# Patient Record
Sex: MALE | Race: WHITE | NOT HISPANIC OR LATINO | Employment: OTHER | URBAN - METROPOLITAN AREA
[De-identification: names, ages, dates, MRNs, and addresses within clinical notes are randomized per-mention and may not be internally consistent; named-entity substitution may affect disease eponyms.]

---

## 2022-04-12 ENCOUNTER — APPOINTMENT (OUTPATIENT)
Dept: CT IMAGING | Facility: HOSPITAL | Age: 72
End: 2022-04-12

## 2022-04-12 ENCOUNTER — HOSPITAL ENCOUNTER (INPATIENT)
Facility: HOSPITAL | Age: 72
LOS: 5 days | Discharge: NURSING, REHAB OR LONG-TERM CARE | End: 2022-04-17
Attending: EMERGENCY MEDICINE | Admitting: HOSPITALIST

## 2022-04-12 ENCOUNTER — APPOINTMENT (OUTPATIENT)
Dept: GENERAL RADIOLOGY | Facility: HOSPITAL | Age: 72
End: 2022-04-12

## 2022-04-12 DIAGNOSIS — R41.82 ALTERED MENTAL STATUS, UNSPECIFIED ALTERED MENTAL STATUS TYPE: Primary | ICD-10-CM

## 2022-04-12 DIAGNOSIS — R63.30 FEEDING DIFFICULTY: ICD-10-CM

## 2022-04-12 PROBLEM — R09.89 SUSPECTED CEREBROVASCULAR ACCIDENT (CVA): Status: ACTIVE | Noted: 2022-04-12

## 2022-04-12 PROBLEM — F03.918 DEMENTIA WITH BEHAVIORAL DISTURBANCE: Chronic | Status: ACTIVE | Noted: 2022-04-12

## 2022-04-12 PROBLEM — I10 ESSENTIAL HYPERTENSION: Chronic | Status: ACTIVE | Noted: 2022-04-12

## 2022-04-12 PROBLEM — R29.810 FACIAL DROOP: Status: ACTIVE | Noted: 2022-04-12

## 2022-04-12 PROBLEM — R47.01 APHASIA: Status: ACTIVE | Noted: 2022-04-12

## 2022-04-12 LAB
ABO GROUP BLD: NORMAL
ALBUMIN SERPL-MCNC: 4.2 G/DL (ref 3.5–5.2)
ALBUMIN/GLOB SERPL: 1.3 G/DL
ALP SERPL-CCNC: 116 U/L (ref 39–117)
ALT SERPL W P-5'-P-CCNC: 11 U/L (ref 1–41)
ANION GAP SERPL CALCULATED.3IONS-SCNC: 12 MMOL/L (ref 5–15)
AST SERPL-CCNC: 20 U/L (ref 1–40)
BASOPHILS # BLD AUTO: 0.1 10*3/MM3 (ref 0–0.2)
BASOPHILS NFR BLD AUTO: 1 % (ref 0–1.5)
BILIRUB SERPL-MCNC: 1 MG/DL (ref 0–1.2)
BILIRUB UR QL STRIP: NEGATIVE
BLD GP AB SCN SERPL QL: NEGATIVE
BUN SERPL-MCNC: 12 MG/DL (ref 8–23)
BUN/CREAT SERPL: 12.5 (ref 7–25)
CALCIUM SPEC-SCNC: 9.2 MG/DL (ref 8.6–10.5)
CHLORIDE SERPL-SCNC: 102 MMOL/L (ref 98–107)
CHOLEST SERPL-MCNC: 123 MG/DL (ref 0–200)
CLARITY UR: CLEAR
CO2 SERPL-SCNC: 25 MMOL/L (ref 22–29)
COLOR UR: ABNORMAL
CREAT SERPL-MCNC: 0.96 MG/DL (ref 0.76–1.27)
DEPRECATED RDW RBC AUTO: 38.5 FL (ref 37–54)
EGFRCR SERPLBLD CKD-EPI 2021: 84 ML/MIN/1.73
EOSINOPHIL # BLD AUTO: 0.1 10*3/MM3 (ref 0–0.4)
EOSINOPHIL NFR BLD AUTO: 1 % (ref 0.3–6.2)
ERYTHROCYTE [DISTWIDTH] IN BLOOD BY AUTOMATED COUNT: 12.8 % (ref 12.3–15.4)
ETHANOL UR QL: <0.01 %
GLOBULIN UR ELPH-MCNC: 3.3 GM/DL
GLUCOSE BLDC GLUCOMTR-MCNC: 111 MG/DL (ref 70–105)
GLUCOSE BLDC GLUCOMTR-MCNC: 94 MG/DL (ref 70–105)
GLUCOSE SERPL-MCNC: 112 MG/DL (ref 65–99)
GLUCOSE UR STRIP-MCNC: NEGATIVE MG/DL
HCT VFR BLD AUTO: 38.5 % (ref 37.5–51)
HDLC SERPL-MCNC: 36 MG/DL (ref 40–60)
HGB BLD-MCNC: 13.7 G/DL (ref 13–17.7)
HGB UR QL STRIP.AUTO: NEGATIVE
HOLD SPECIMEN: NORMAL
HOLD SPECIMEN: NORMAL
INR PPP: 1.01 (ref 0.93–1.1)
KETONES UR QL STRIP: NEGATIVE
LDLC SERPL CALC-MCNC: 68 MG/DL (ref 0–100)
LDLC/HDLC SERPL: 1.87 {RATIO}
LEUKOCYTE ESTERASE UR QL STRIP.AUTO: NEGATIVE
LYMPHOCYTES # BLD AUTO: 0.6 10*3/MM3 (ref 0.7–3.1)
LYMPHOCYTES NFR BLD AUTO: 11.1 % (ref 19.6–45.3)
MCH RBC QN AUTO: 30.9 PG (ref 26.6–33)
MCHC RBC AUTO-ENTMCNC: 35.5 G/DL (ref 31.5–35.7)
MCV RBC AUTO: 86.8 FL (ref 79–97)
MONOCYTES # BLD AUTO: 0.4 10*3/MM3 (ref 0.1–0.9)
MONOCYTES NFR BLD AUTO: 7 % (ref 5–12)
NEUTROPHILS NFR BLD AUTO: 4.4 10*3/MM3 (ref 1.7–7)
NEUTROPHILS NFR BLD AUTO: 79.9 % (ref 42.7–76)
NITRITE UR QL STRIP: NEGATIVE
NRBC BLD AUTO-RTO: 0.1 /100 WBC (ref 0–0.2)
PH UR STRIP.AUTO: 5.5 [PH] (ref 5–8)
PLATELET # BLD AUTO: 253 10*3/MM3 (ref 140–450)
PMV BLD AUTO: 8.6 FL (ref 6–12)
POTASSIUM SERPL-SCNC: 3.9 MMOL/L (ref 3.5–5.2)
PROT SERPL-MCNC: 7.5 G/DL (ref 6–8.5)
PROT UR QL STRIP: NEGATIVE
PROTHROMBIN TIME: 10.4 SECONDS (ref 9.6–11.7)
RBC # BLD AUTO: 4.43 10*6/MM3 (ref 4.14–5.8)
RH BLD: POSITIVE
SARS-COV-2 RNA PNL SPEC NAA+PROBE: NOT DETECTED
SODIUM SERPL-SCNC: 139 MMOL/L (ref 136–145)
SP GR UR STRIP: 1.01 (ref 1–1.03)
T&S EXPIRATION DATE: NORMAL
TRIGL SERPL-MCNC: 99 MG/DL (ref 0–150)
TSH SERPL DL<=0.05 MIU/L-ACNC: 1.88 UIU/ML (ref 0.27–4.2)
UROBILINOGEN UR QL STRIP: ABNORMAL
VLDLC SERPL-MCNC: 19 MG/DL (ref 5–40)
WBC NRBC COR # BLD: 5.5 10*3/MM3 (ref 3.4–10.8)
WHOLE BLOOD HOLD SPECIMEN: NORMAL

## 2022-04-12 PROCEDURE — 93005 ELECTROCARDIOGRAM TRACING: CPT | Performed by: EMERGENCY MEDICINE

## 2022-04-12 PROCEDURE — 80053 COMPREHEN METABOLIC PANEL: CPT | Performed by: EMERGENCY MEDICINE

## 2022-04-12 PROCEDURE — 99285 EMERGENCY DEPT VISIT HI MDM: CPT

## 2022-04-12 PROCEDURE — 86900 BLOOD TYPING SEROLOGIC ABO: CPT

## 2022-04-12 PROCEDURE — 82607 VITAMIN B-12: CPT | Performed by: NURSE PRACTITIONER

## 2022-04-12 PROCEDURE — 85025 COMPLETE CBC W/AUTO DIFF WBC: CPT | Performed by: EMERGENCY MEDICINE

## 2022-04-12 PROCEDURE — 99222 1ST HOSP IP/OBS MODERATE 55: CPT | Performed by: NURSE PRACTITIONER

## 2022-04-12 PROCEDURE — 86850 RBC ANTIBODY SCREEN: CPT | Performed by: EMERGENCY MEDICINE

## 2022-04-12 PROCEDURE — 84443 ASSAY THYROID STIM HORMONE: CPT | Performed by: NURSE PRACTITIONER

## 2022-04-12 PROCEDURE — 87635 SARS-COV-2 COVID-19 AMP PRB: CPT | Performed by: NURSE PRACTITIONER

## 2022-04-12 PROCEDURE — 86901 BLOOD TYPING SEROLOGIC RH(D): CPT | Performed by: EMERGENCY MEDICINE

## 2022-04-12 PROCEDURE — 81003 URINALYSIS AUTO W/O SCOPE: CPT | Performed by: EMERGENCY MEDICINE

## 2022-04-12 PROCEDURE — 36415 COLL VENOUS BLD VENIPUNCTURE: CPT | Performed by: EMERGENCY MEDICINE

## 2022-04-12 PROCEDURE — 82962 GLUCOSE BLOOD TEST: CPT

## 2022-04-12 PROCEDURE — 85610 PROTHROMBIN TIME: CPT | Performed by: EMERGENCY MEDICINE

## 2022-04-12 PROCEDURE — 70450 CT HEAD/BRAIN W/O DYE: CPT

## 2022-04-12 PROCEDURE — 71045 X-RAY EXAM CHEST 1 VIEW: CPT

## 2022-04-12 PROCEDURE — 86900 BLOOD TYPING SEROLOGIC ABO: CPT | Performed by: EMERGENCY MEDICINE

## 2022-04-12 PROCEDURE — 86901 BLOOD TYPING SEROLOGIC RH(D): CPT

## 2022-04-12 PROCEDURE — 83036 HEMOGLOBIN GLYCOSYLATED A1C: CPT | Performed by: NURSE PRACTITIONER

## 2022-04-12 PROCEDURE — 82077 ASSAY SPEC XCP UR&BREATH IA: CPT | Performed by: EMERGENCY MEDICINE

## 2022-04-12 PROCEDURE — 80061 LIPID PANEL: CPT | Performed by: NURSE PRACTITIONER

## 2022-04-12 RX ORDER — ATORVASTATIN CALCIUM 40 MG/1
80 TABLET, FILM COATED ORAL NIGHTLY
Status: DISCONTINUED | OUTPATIENT
Start: 2022-04-12 | End: 2022-04-17 | Stop reason: HOSPADM

## 2022-04-12 RX ORDER — ASPIRIN 325 MG
325 TABLET ORAL DAILY
Status: DISCONTINUED | OUTPATIENT
Start: 2022-04-12 | End: 2022-04-15

## 2022-04-12 RX ORDER — SODIUM CHLORIDE 0.9 % (FLUSH) 0.9 %
10 SYRINGE (ML) INJECTION AS NEEDED
Status: DISCONTINUED | OUTPATIENT
Start: 2022-04-12 | End: 2022-04-17 | Stop reason: HOSPADM

## 2022-04-12 RX ORDER — ASPIRIN 300 MG/1
300 SUPPOSITORY RECTAL DAILY
Status: DISCONTINUED | OUTPATIENT
Start: 2022-04-12 | End: 2022-04-15

## 2022-04-12 NOTE — H&P
[facility] Medicine Services      Patient Name: Goran Dunbar  : 1950  MRN: [id number]  Primary Care Physician:  Suzanna Posada PA  Date of admission: 2022      Subjective      Chief Complaint: aphasia     History of Present Illness: Goran Dunbar is a 72 y.o. male w/pmh of dementia, and hypertension who presented to [facility] on 2022 complaining of speech difficulties and EMS reports of left sided facial droop.  Patient is unable to provide any past medical history detail.  His family at bedside states that he is seen by physician in [place] and is prescribed amlodipine, metoprolol, potassium and Namenda.  They state he has only been seen at this facility 3 times in those 3 times over the only physicians he has seen in his life.  The daughter at bedside states that he was fine in his normal state of health yesterday all day, and when he went to bed last night.  She states when he awoke he was unable to speak, she did try to feed him but he coughed and choked.  She denies any previous dysphagia aphasia, or stroke in the past.  I will order a chest x-ray to make sure there  aspiration noted, no aspiration pneumonia noted.  WBCs are normal but patient has elevated neutrophils on CBC.  Upon exam patient does have noted right facial weakness, he is unable to communicate verbally, and becomes very frustrated trying to answer questions on paper.  Family at bedside states that this has been ongoing all day when asked any questions he becomes very frustrated and strikes himself in the face.  Patient does report that he fell and hit the right side of his face today, the daughter at bedside who is his caregiver states that she did not see him fall at any time today.  Patient does have erythematous silvana on right side of face.  There are no other obvious injuries noted.  Patient has attempted to get up without assistance, and has attempted to remove IV lines  while family at bedside in the emergency department.  Will order a bedside sitter for safety.    Vital signs upon arrival to the emergency department: /77, HR 83, RR 16, oxygen saturation 98% on room air, patient is afebrile with a T-max of 98.5.  Initial evaluation in the emergency department included:   Initial labs with the following abnormalities noted: Slightly elevated glucose at 112, 79.9% neutrophils.  Urinalysis was completed with no abnormalities noted.  Blood toxicology negative  Chest x-ray completed shows no acute cardiopulmonary abnormalities.  CT scan of the head stroke protocol was completed shows no acute intracranial abnormalities, mild global volume loss and mild probable chronic small vessel ischemic changes, and a perinasal sinus disease was noted.   EKG completed shows sinus rhythm heart rate 79 with RBBB.  NIHSS score 7 at time of arrival.    Patient was not administered any pharmacological agents while in the emergency department.  Patient will be admitted to hospitalist group for further evaluation and treatment of possible CVA, aphasia, and any other acute and or chronic conditions.  Neurology consulted for further stroke evaluation.    Review of Systems   Unable to perform ROS: acuity of condition        Personal History     Past Medical History:   Diagnosis Date   • Aphasia 4/12/2022   • Dementia with behavioral disturbance (HCC) 4/12/2022   • Essential hypertension 4/12/2022       No past surgical history on file.    Family History: Family history is unknown by patient.   Social History:  reports that he has never smoked. He has never used smokeless tobacco. He reports that he does not drink alcohol and does not use drugs.    Home Medications:  Prior to Admission Medications     None            Allergies:  No Known Allergies    Objective      Vitals:   Temp:  [98.5 °F (36.9 °C)] 98.5 °F (36.9 °C)  Heart Rate:  [83-95] 86  Resp:  [16] 16  BP: (106-140)/(72-77) 106/72    Physical  Exam  Vitals reviewed.   HENT:      Head: Normocephalic and atraumatic.      Right Ear: External ear normal.      Left Ear: External ear normal.      Nose: Nose normal.      Mouth/Throat:      Mouth: Mucous membranes are dry.   Eyes:      Pupils: Pupils are equal, round, and reactive to light.   Pulmonary:      Breath sounds: Normal breath sounds.   Abdominal:      General: Bowel sounds are normal.      Palpations: Abdomen is soft.   Neurological:      Mental Status: He is alert. He is disoriented.      Cranial Nerves: Cranial nerve deficit and facial asymmetry present.      Motor: Weakness present.      Coordination: Coordination is intact.      Comments: Right sided facial droop noted   Patient unable to speak, he does write down answers and is able to make needs known.    Psychiatric:         Mood and Affect: Mood is anxious. Affect is not inappropriate.         Speech: He is noncommunicative.         Behavior: Behavior is uncooperative.         Cognition and Memory: Cognition is impaired. He exhibits impaired recent memory.         Judgment: Judgment is impulsive and inappropriate.         Result Review    Result Review:  I have personally reviewed the results from the time of this admission to 4/12/2022 22:05 EDT and agree with these findings:  [x]  Laboratory  [x]  Microbiology  [x]  Radiology  [x]  EKG/Telemetry   []  Cardiology/Vascular   []  Pathology  []  Old records  []  Other:  Most notable findings include:      Assessment/Plan        Active Hospital Problems:  Active Hospital Problems    Diagnosis    • **Aphasia    • Essential hypertension    • Dementia with behavioral disturbance (HCC)      Plan:   Suspected CVA  Right sided facial droop  Aphasia  -Stroke work-up initiated in emergency department  -CT scan of the head completed and reviewed  -X-ray completed and reviewed  -Neurology consulted  -Stroke coordinator consulted  -Case management consulted for discharge planning  -PT, OT, SLP  consulted  -Neurochecks per policy  -NIHSS per policy  -NPO secondary to aphasia until speech therapy consult/swallow evaluation  -ASA, and statin ordered  -As needed Cardene GTT for systolic blood pressure greater than 220  -Check TSH, hemoglobin A1c, and lipid panel  -Monitor CBC, CMP  -Accu-Cheks every 6 hours  -MRI of the brain without contrast ordered  -Transthoracic echocardiogram ordered  -Vital signs per policy  -Continuous cardiac monitoring pulse oximetry  -Supplemental oxygen as needed for hypoxia/respiratory distress  -Encourage pulmonary hygiene-turn cough deep breathe/incentive spirometry  -Strict I's and O's  -Daily weights  -Falls precautions  -Chest x-ray ordered for possible aspiration pneumonia secondary to coughing secondary to dysphagia/aphasia    Dementia  -Chronic  -Unknown duration/status  -Reorient as needed  -Safety precautions  -Continue home Namenda pending med rec verification, and patient swallow ability    Essential hypertension  -Vital signs per policy  -Patient on metoprolol and amlodipine at home continue when clinically appropriate.  -Cardene drip per stroke protocol ordered as above.        DVT prophylaxis:  Mechanical DVT prophylaxis orders are present.    CODE STATUS:    Code Status (Patient has no pulse and is not breathing): CPR (Attempt to Resuscitate)  Medical Interventions (Patient has pulse or is breathing): Full Support    Admission Status:  I believe this patient meets inpatient status.    I discussed the patient's findings and my recommendations with patient.    This patient has been examined wearing appropriate Personal Protective Equipment . 04/12/22      Signature: Electronically signed by DAYO Inman, 04/12/22, 7:19 PM EDT.

## 2022-04-12 NOTE — ED PROVIDER NOTES
"Subjective   Chief complaint: Unable to speak    72-year-old male with history of dementia presents unable to speak.  Patient has apparently been nonverbal since he woke up this morning.  He was last at his baseline yesterday before bed.  EMS reports that there is also a subtle right-sided facial droop.  No other focal motor or sensory deficits were noted.  Patient denies any complaints of pain.  No known recent illness.  No known alleviating or exacerbating factors.  EMS states he has been able to ask and answer questions by writing it down.  He has had some repetitive questioning.      History provided by:  Patient and EMS personnel      Review of Systems   Constitutional: Negative for fever.   HENT: Negative for congestion and sore throat.    Eyes: Negative for redness.   Respiratory: Negative for cough and shortness of breath.    Cardiovascular: Negative for chest pain.   Gastrointestinal: Negative for abdominal pain, diarrhea and vomiting.   Genitourinary: Negative for dysuria.   Musculoskeletal: Negative for back pain.   Skin: Negative for rash.   Neurological: Positive for speech difficulty. Negative for dizziness, weakness, numbness and headaches.   Psychiatric/Behavioral: Positive for confusion.       No past medical history on file.    No Known Allergies    No past surgical history on file.    No family history on file.    Social History     Socioeconomic History   • Marital status: Single       /77   Pulse 95   Temp 98.5 °F (36.9 °C) (Oral)   Resp 16   Ht 177.8 cm (70\")   SpO2 98%       Objective   Physical Exam  Vitals and nursing note reviewed.   Constitutional:       Appearance: Normal appearance. He is well-developed.   HENT:      Head: Normocephalic and atraumatic.   Eyes:      Pupils: Pupils are equal, round, and reactive to light.   Cardiovascular:      Rate and Rhythm: Normal rate and regular rhythm.      Heart sounds: Normal heart sounds.   Pulmonary:      Effort: Pulmonary effort is " normal. No respiratory distress.      Breath sounds: Normal breath sounds.   Abdominal:      General: Bowel sounds are normal.      Palpations: Abdomen is soft.      Tenderness: There is no abdominal tenderness.   Skin:     General: Skin is warm and dry.   Neurological:      Mental Status: He is alert and oriented to person, place, and time.      Comments: Patient is mostly nonverbal.  He attempts some mumbling speech.  There is a subtle right-sided facial droop.  No other focal motor or sensory deficit appreciated.         Procedures           ED Course      My interpretation of EKG shows sinus rhythm, rate of 79, right bundle branch block, no ST elevation     Results for orders placed or performed during the hospital encounter of 04/12/22   Comprehensive Metabolic Panel    Specimen: Blood   Result Value Ref Range    Glucose 112 (H) 65 - 99 mg/dL    BUN 12 8 - 23 mg/dL    Creatinine 0.96 0.76 - 1.27 mg/dL    Sodium 139 136 - 145 mmol/L    Potassium 3.9 3.5 - 5.2 mmol/L    Chloride 102 98 - 107 mmol/L    CO2 25.0 22.0 - 29.0 mmol/L    Calcium 9.2 8.6 - 10.5 mg/dL    Total Protein 7.5 6.0 - 8.5 g/dL    Albumin 4.20 3.50 - 5.20 g/dL    ALT (SGPT) 11 1 - 41 U/L    AST (SGOT) 20 1 - 40 U/L    Alkaline Phosphatase 116 39 - 117 U/L    Total Bilirubin 1.0 0.0 - 1.2 mg/dL    Globulin 3.3 gm/dL    A/G Ratio 1.3 g/dL    BUN/Creatinine Ratio 12.5 7.0 - 25.0    Anion Gap 12.0 5.0 - 15.0 mmol/L    eGFR 84.0 >60.0 mL/min/1.73   Protime-INR    Specimen: Blood   Result Value Ref Range    Protime 10.4 9.6 - 11.7 Seconds    INR 1.01 0.93 - 1.10   Urinalysis With Culture If Indicated - Urine, Clean Catch    Specimen: Urine, Clean Catch   Result Value Ref Range    Color, UA Dark Yellow (A) Yellow, Straw    Appearance, UA Clear Clear    pH, UA 5.5 5.0 - 8.0    Specific Gravity, UA 1.010 1.005 - 1.030    Glucose, UA Negative Negative    Ketones, UA Negative Negative    Bilirubin, UA Negative Negative    Blood, UA Negative Negative     Protein, UA Negative Negative    Leuk Esterase, UA Negative Negative    Nitrite, UA Negative Negative    Urobilinogen, UA 1.0 E.U./dL 0.2 - 1.0 E.U./dL   CBC Auto Differential    Specimen: Blood   Result Value Ref Range    WBC 5.50 3.40 - 10.80 10*3/mm3    RBC 4.43 4.14 - 5.80 10*6/mm3    Hemoglobin 13.7 13.0 - 17.7 g/dL    Hematocrit 38.5 37.5 - 51.0 %    MCV 86.8 79.0 - 97.0 fL    MCH 30.9 26.6 - 33.0 pg    MCHC 35.5 31.5 - 35.7 g/dL    RDW 12.8 12.3 - 15.4 %    RDW-SD 38.5 37.0 - 54.0 fl    MPV 8.6 6.0 - 12.0 fL    Platelets 253 140 - 450 10*3/mm3    Neutrophil % 79.9 (H) 42.7 - 76.0 %    Lymphocyte % 11.1 (L) 19.6 - 45.3 %    Monocyte % 7.0 5.0 - 12.0 %    Eosinophil % 1.0 0.3 - 6.2 %    Basophil % 1.0 0.0 - 1.5 %    Neutrophils, Absolute 4.40 1.70 - 7.00 10*3/mm3    Lymphocytes, Absolute 0.60 (L) 0.70 - 3.10 10*3/mm3    Monocytes, Absolute 0.40 0.10 - 0.90 10*3/mm3    Eosinophils, Absolute 0.10 0.00 - 0.40 10*3/mm3    Basophils, Absolute 0.10 0.00 - 0.20 10*3/mm3    nRBC 0.1 0.0 - 0.2 /100 WBC   Ethanol    Specimen: Blood   Result Value Ref Range    Ethanol % <0.010 %   POC Glucose Once    Specimen: Blood   Result Value Ref Range    Glucose 111 (H) 70 - 105 mg/dL   ECG 12 Lead   Result Value Ref Range    QT Interval 391 ms   Type & Screen    Specimen: Blood   Result Value Ref Range    ABO Type B     RH type Positive     Antibody Screen Negative     T&S Expiration Date 4/15/2022 11:59:59 PM    Green Top (Gel)   Result Value Ref Range    Extra Tube Hold for add-ons.    Gold Top - SST   Result Value Ref Range    Extra Tube Hold for add-ons.    Light Blue Top   Result Value Ref Range    Extra Tube hold for add-on      XR Chest 1 View    Result Date: 4/12/2022  No acute cardiopulmonary abnormality is identified.  Electronically Signed By-Parul Bush MD On:4/12/2022 6:30 PM This report was finalized on [id number] by  Parul Bush MD.    CT Head Without Contrast Stroke Protocol    Result Date: 4/12/2022  1.No  acute intracranial abnormality is identified on head CT. 2.Mild global volume loss and mild probable chronic small vessel ischemic change. 3.Paranasal sinus disease.  Electronically Signed By-Parul Bush MD On:4/12/2022 6:34 PM This report was finalized on [id number] by  Parul Bush MD.                                          MDM   Patient had the above evaluation.  Results were discussed with the patient and family.  CT head shows no acute abnormality.  Chest x-ray shows no acute disease.  EKG shows no acute ischemia.  Troponin is negative.  Urinalysis shows no UTI.  Patient may have had a mild stroke causing the aphasia and mild right-sided facial droop.  He is not a TPA candidate as his last known well was yesterday.  He will be admitted for further evaluation.  I discussed with the nurse practitioner on-call for the hospitalist who agreed to admit.      Final diagnoses:   Altered mental status, unspecified altered mental status type       ED Disposition  ED Disposition     ED Disposition   Decision to Admit    Condition   --    Comment   Level of Care: Telemetry [5]   Diagnosis: Altered mental status [780.97.ICD-9-CM]   Admitting Physician: GEORGE SPARKS [[id number]]   Attending Physician: GEORGE SPARKS [[id number]]   Bed Request Comments: JOHAN   Certification: I Certify That Inpatient Hospital Services Are Medically Necessary For Greater Than 2 Midnights               No follow-up provider specified.       Medication List      No changes were made to your prescriptions during this visit.          Anuel Abad MD  04/12/22 1921

## 2022-04-13 ENCOUNTER — APPOINTMENT (OUTPATIENT)
Dept: GENERAL RADIOLOGY | Facility: HOSPITAL | Age: 72
End: 2022-04-13

## 2022-04-13 ENCOUNTER — APPOINTMENT (OUTPATIENT)
Dept: CT IMAGING | Facility: HOSPITAL | Age: 72
End: 2022-04-13

## 2022-04-13 ENCOUNTER — APPOINTMENT (OUTPATIENT)
Dept: CARDIOLOGY | Facility: HOSPITAL | Age: 72
End: 2022-04-13

## 2022-04-13 ENCOUNTER — APPOINTMENT (OUTPATIENT)
Dept: MRI IMAGING | Facility: HOSPITAL | Age: 72
End: 2022-04-13

## 2022-04-13 LAB
ALBUMIN SERPL-MCNC: 3.9 G/DL (ref 3.5–5.2)
ALBUMIN/GLOB SERPL: 1.3 G/DL
ALP SERPL-CCNC: 104 U/L (ref 39–117)
ALT SERPL W P-5'-P-CCNC: 14 U/L (ref 1–41)
ANION GAP SERPL CALCULATED.3IONS-SCNC: 12 MMOL/L (ref 5–15)
AST SERPL-CCNC: 21 U/L (ref 1–40)
BILIRUB SERPL-MCNC: 0.9 MG/DL (ref 0–1.2)
BUN SERPL-MCNC: 11 MG/DL (ref 8–23)
BUN/CREAT SERPL: 12.8 (ref 7–25)
CALCIUM SPEC-SCNC: 9 MG/DL (ref 8.6–10.5)
CHLORIDE SERPL-SCNC: 104 MMOL/L (ref 98–107)
CO2 SERPL-SCNC: 25 MMOL/L (ref 22–29)
CREAT SERPL-MCNC: 0.86 MG/DL (ref 0.76–1.27)
DEPRECATED RDW RBC AUTO: 39.4 FL (ref 37–54)
EGFRCR SERPLBLD CKD-EPI 2021: 92 ML/MIN/1.73
ERYTHROCYTE [DISTWIDTH] IN BLOOD BY AUTOMATED COUNT: 13.2 % (ref 12.3–15.4)
GLOBULIN UR ELPH-MCNC: 3 GM/DL
GLUCOSE BLDC GLUCOMTR-MCNC: 106 MG/DL (ref 70–105)
GLUCOSE BLDC GLUCOMTR-MCNC: 77 MG/DL (ref 70–105)
GLUCOSE SERPL-MCNC: 93 MG/DL (ref 65–99)
HBA1C MFR BLD: 5.6 % (ref 3.5–5.6)
HCT VFR BLD AUTO: 36.7 % (ref 37.5–51)
HGB BLD-MCNC: 12.9 G/DL (ref 13–17.7)
MCH RBC QN AUTO: 30.3 PG (ref 26.6–33)
MCHC RBC AUTO-ENTMCNC: 35.1 G/DL (ref 31.5–35.7)
MCV RBC AUTO: 86.4 FL (ref 79–97)
PLATELET # BLD AUTO: 241 10*3/MM3 (ref 140–450)
PMV BLD AUTO: 9.1 FL (ref 6–12)
POTASSIUM SERPL-SCNC: 3.6 MMOL/L (ref 3.5–5.2)
PROT SERPL-MCNC: 6.9 G/DL (ref 6–8.5)
RBC # BLD AUTO: 4.25 10*6/MM3 (ref 4.14–5.8)
SODIUM SERPL-SCNC: 141 MMOL/L (ref 136–145)
VIT B12 BLD-MCNC: 235 PG/ML (ref 211–946)
WBC NRBC COR # BLD: 5.8 10*3/MM3 (ref 3.4–10.8)

## 2022-04-13 PROCEDURE — 71045 X-RAY EXAM CHEST 1 VIEW: CPT

## 2022-04-13 PROCEDURE — 85027 COMPLETE CBC AUTOMATED: CPT | Performed by: NURSE PRACTITIONER

## 2022-04-13 PROCEDURE — 25010000002 HALOPERIDOL LACTATE PER 5 MG: Performed by: HOSPITALIST

## 2022-04-13 PROCEDURE — 70496 CT ANGIOGRAPHY HEAD: CPT

## 2022-04-13 PROCEDURE — 82962 GLUCOSE BLOOD TEST: CPT

## 2022-04-13 PROCEDURE — 86140 C-REACTIVE PROTEIN: CPT | Performed by: HOSPITALIST

## 2022-04-13 PROCEDURE — 93306 TTE W/DOPPLER COMPLETE: CPT

## 2022-04-13 PROCEDURE — 92610 EVALUATE SWALLOWING FUNCTION: CPT

## 2022-04-13 PROCEDURE — 99232 SBSQ HOSP IP/OBS MODERATE 35: CPT | Performed by: HOSPITALIST

## 2022-04-13 PROCEDURE — 80053 COMPREHEN METABOLIC PANEL: CPT | Performed by: NURSE PRACTITIONER

## 2022-04-13 PROCEDURE — 93306 TTE W/DOPPLER COMPLETE: CPT | Performed by: INTERNAL MEDICINE

## 2022-04-13 PROCEDURE — 70498 CT ANGIOGRAPHY NECK: CPT

## 2022-04-13 PROCEDURE — 70551 MRI BRAIN STEM W/O DYE: CPT

## 2022-04-13 PROCEDURE — 0 IOPAMIDOL PER 1 ML: Performed by: HOSPITALIST

## 2022-04-13 PROCEDURE — 74230 X-RAY XM SWLNG FUNCJ C+: CPT

## 2022-04-13 PROCEDURE — 84145 PROCALCITONIN (PCT): CPT | Performed by: HOSPITALIST

## 2022-04-13 PROCEDURE — 99222 1ST HOSP IP/OBS MODERATE 55: CPT | Performed by: NURSE PRACTITIONER

## 2022-04-13 PROCEDURE — 92611 MOTION FLUOROSCOPY/SWALLOW: CPT

## 2022-04-13 PROCEDURE — 25010000002 LORAZEPAM PER 2 MG: Performed by: HOSPITALIST

## 2022-04-13 RX ORDER — LORAZEPAM 2 MG/ML
1 INJECTION INTRAMUSCULAR ONCE AS NEEDED
Status: COMPLETED | OUTPATIENT
Start: 2022-04-13 | End: 2022-04-13

## 2022-04-13 RX ORDER — HALOPERIDOL 5 MG/ML
2.5 INJECTION INTRAMUSCULAR ONCE AS NEEDED
Status: COMPLETED | OUTPATIENT
Start: 2022-04-13 | End: 2022-04-13

## 2022-04-13 RX ORDER — SODIUM CHLORIDE 9 MG/ML
75 INJECTION, SOLUTION INTRAVENOUS CONTINUOUS
Status: DISCONTINUED | OUTPATIENT
Start: 2022-04-13 | End: 2022-04-17 | Stop reason: HOSPADM

## 2022-04-13 RX ORDER — POLYETHYLENE GLYCOL 3350 17 G/17G
17 POWDER, FOR SOLUTION ORAL DAILY
Status: DISCONTINUED | OUTPATIENT
Start: 2022-04-13 | End: 2022-04-17 | Stop reason: HOSPADM

## 2022-04-13 RX ORDER — MEMANTINE HYDROCHLORIDE 5 MG/1
5 TABLET ORAL DAILY
COMMUNITY

## 2022-04-13 RX ORDER — AMLODIPINE BESYLATE 10 MG/1
10 TABLET ORAL DAILY
COMMUNITY
End: 2022-04-17 | Stop reason: HOSPADM

## 2022-04-13 RX ORDER — RISPERIDONE 0.25 MG/1
0.5 TABLET ORAL EVERY 12 HOURS PRN
Status: DISCONTINUED | OUTPATIENT
Start: 2022-04-13 | End: 2022-04-17 | Stop reason: HOSPADM

## 2022-04-13 RX ORDER — MEMANTINE HYDROCHLORIDE 5 MG/1
5 TABLET ORAL NIGHTLY
Status: DISCONTINUED | OUTPATIENT
Start: 2022-04-13 | End: 2022-04-17 | Stop reason: HOSPADM

## 2022-04-13 RX ORDER — METOPROLOL SUCCINATE 50 MG/1
50 TABLET, EXTENDED RELEASE ORAL DAILY
COMMUNITY
End: 2022-04-17 | Stop reason: HOSPADM

## 2022-04-13 RX ORDER — POTASSIUM CHLORIDE 20 MEQ/1
20 TABLET, EXTENDED RELEASE ORAL DAILY
Status: ON HOLD | COMMUNITY
End: 2022-04-17 | Stop reason: SDUPTHER

## 2022-04-13 RX ORDER — AMOXICILLIN 250 MG
2 CAPSULE ORAL 2 TIMES DAILY
Status: DISCONTINUED | OUTPATIENT
Start: 2022-04-13 | End: 2022-04-17 | Stop reason: HOSPADM

## 2022-04-13 RX ADMIN — ASPIRIN 300 MG: 300 SUPPOSITORY RECTAL at 01:59

## 2022-04-13 RX ADMIN — IOPAMIDOL 100 ML: 755 INJECTION, SOLUTION INTRAVENOUS at 11:25

## 2022-04-13 RX ADMIN — BARIUM SULFATE 50 ML: 400 SUSPENSION ORAL at 10:57

## 2022-04-13 RX ADMIN — LORAZEPAM 1 MG: 2 INJECTION INTRAMUSCULAR; INTRAVENOUS at 11:49

## 2022-04-13 RX ADMIN — HALOPERIDOL LACTATE 2.5 MG: 5 INJECTION, SOLUTION INTRAMUSCULAR at 11:50

## 2022-04-13 RX ADMIN — BARIUM SULFATE 50 ML: 400 SUSPENSION ORAL at 10:58

## 2022-04-13 NOTE — PLAN OF CARE
Goal Outcome Evaluation:           Problem: Adult Inpatient Plan of Care  Goal: Absence of Hospital-Acquired Illness or Injury  Intervention: Identify and Manage Fall Risk  Recent Flowsheet Documentation  Taken 4/13/2022 1600 by Holly, Magy, RN  Safety Promotion/Fall Prevention:   safety round/check completed   room organization consistent   nonskid shoes/slippers when out of bed   fall prevention program maintained   clutter free environment maintained   activity supervised   assistive device/personal items within reach  Taken 4/13/2022 1200 by Holly, Magy, RN  Safety Promotion/Fall Prevention:   safety round/check completed   room organization consistent   nonskid shoes/slippers when out of bed   fall prevention program maintained   clutter free environment maintained   assistive device/personal items within reach   activity supervised  Taken 4/13/2022 1000 by Karns, Magy, RN  Safety Promotion/Fall Prevention:   room organization consistent   safety round/check completed   nonskid shoes/slippers when out of bed   fall prevention program maintained   clutter free environment maintained   assistive device/personal items within reach   activity supervised  Taken 4/13/2022 0800 by Magy Barrera RN  Safety Promotion/Fall Prevention:   safety round/check completed   room organization consistent   nonskid shoes/slippers when out of bed   fall prevention program maintained   clutter free environment maintained   activity supervised   assistive device/personal items within reach     Problem: Adult Inpatient Plan of Care  Goal: Absence of Hospital-Acquired Illness or Injury  Intervention: Prevent Skin Injury  Recent Flowsheet Documentation  Taken 4/13/2022 0800 by Magy Barrera, RN  Skin Protection:   adhesive use limited   incontinence pads utilized

## 2022-04-13 NOTE — PROGRESS NOTES
[facility] Medicine Services Daily Progress Note    Patient Name: Goran Dunbar  : 1950  MRN: [id number]  Primary Care Physician:  Suzanna Posada PA  Date of admission: 2022      Subjective      Chief Complaint: confusion      Patient Reports:    22: Patient confused.  History of dementia.    Review of Systems   Unable to perform ROS: mental status change          Objective      Vitals:   Temp:  [98.4 °F (36.9 °C)-98.6 °F (37 °C)] 98.6 °F (37 °C)  Heart Rate:  [53-95] 53  Resp:  [12-16] 12  BP: (106-140)/(63-79) 113/63    Physical Exam  HENT:      Head: Normocephalic.      Nose: Nose normal.   Eyes:      General: No scleral icterus.     Extraocular Movements: Extraocular movements intact.      Pupils: Pupils are equal, round, and reactive to light.   Cardiovascular:      Rate and Rhythm: Normal rate and regular rhythm.   Pulmonary:      Effort: Pulmonary effort is normal.      Breath sounds: Normal breath sounds.   Abdominal:      General: Bowel sounds are normal.      Tenderness: There is no abdominal tenderness.   Musculoskeletal:      Cervical back: Normal range of motion.      Comments: Moves all extremities   Lymphadenopathy:      Cervical: No cervical adenopathy.   Skin:     General: Skin is warm.      Findings: No rash.   Neurological:      Mental Status: He is alert. He is confused.   Psychiatric:         Cognition and Memory: Memory is impaired.           Result Review    Result Review:  I have personally reviewed the results from the time of this admission to 2022 10:08 EDT and agree with these findings:  [x]  Laboratory  []  Microbiology  [x]  Radiology  []  EKG/Telemetry   []  Cardiology/Vascular   []  Pathology  [x]  Old records  []  Other:      Wounds (last 24 hours)     LDA Wound     Row Name               Wound 22 Bilateral medial coccyx Abrasion    Wound - Properties Group Placement Date: 22  - Placement Time: 421  Present on Hospital Admission: Y  -LAVELLE Side: Bilateral  -KH Orientation: medial  -KH Location: coccyx  -KH Primary Wound Type: Abrasion  -KH      Retired Wound - Properties Group Placement Date: 04/13/22 -KH Placement Time: 0421 -KH Present on Hospital Admission: Y  -LAVELLE Side: Bilateral  -KH Orientation: medial  -KH Location: coccyx  -KH Primary Wound Type: Abrasion  -KH      Retired Wound - Properties Group Date first assessed: 04/13/22 -KH Time first assessed: 0421 -KH Present on Hospital Admission: Y  -LAVELLE Side: Bilateral  -KH Location: coccyx  -KH Primary Wound Type: Abrasion  -KH            User Key  (r) = Recorded By, (t) = Taken By, (c) = Cosigned By    Initials Name Provider Type    Daija Rosario RN Registered Nurse                  Assessment/Plan      Brief Patient Summary:  73-year-old male with dementia and admission for slurred speech      aspirin, 325 mg, Oral, Daily   Or  aspirin, 300 mg, Rectal, Daily  atorvastatin, 80 mg, Oral, Nightly  memantine, 5 mg, Oral, Nightly             Active Hospital Problems:  Active Hospital Problems    Diagnosis    • **Suspected cerebrovascular accident (CVA)    • Aphasia    • Essential hypertension    • Dementia with behavioral disturbance (HCC)    • Facial droop        Slurred speech:  -s/p CT head in ED   -CTA head and neck ordered  -MRI brain ordered  -Seen by neurology      Dementia with behavioral disturbance:  -Patient on Namenda  -We will add risperidone  -Patient lives with family    Hypertension:  -Recent please started on metoprolol and amlodipine by PCP      DVT prophylaxis:  Mechanical DVT prophylaxis orders are present.    CODE STATUS:    Code Status (Patient has no pulse and is not breathing): CPR (Attempt to Resuscitate)  Medical Interventions (Patient has pulse or is breathing): Full Support      Disposition:  I expect patient to be discharged defer.    This patient has been examined wearing appropriate Personal Protective Equipment and  discussed with nursing. 04/13/22      Electronically signed by Jewel Wyatt DO, 04/13/22, 10:08 EDT.  [facility] Hospitalist Team

## 2022-04-13 NOTE — THERAPY EVALUATION
Acute Care - Speech Language Pathology   Swallow Initial Evaluation [facility]     Patient Name: Goran Dunbar  : 1950  MRN: [id number]  Today's Date: 2022               Admit Date: 2022    Visit Dx:     ICD-10-CM ICD-9-CM   1. Altered mental status, unspecified altered mental status type  R41.82 780.97     Patient Active Problem List   Diagnosis   • Aphasia   • Essential hypertension   • Dementia with behavioral disturbance (HCC)   • Facial droop   • Suspected cerebrovascular accident (CVA)     Past Medical History:   Diagnosis Date   • Aphasia 2022   • Dementia with behavioral disturbance (HCC) 2022   • Essential hypertension 2022   • Facial droop 2022   • Suspected cerebrovascular accident (CVA) 2022     No past surgical history on file.    SLP Recommendation and Plan  SLP Swallowing Diagnosis: suspected pharyngeal dysphagia (22)  SLP Diet Recommendation: NPO (22)              Recommended Diagnostics: VFSS (MBS) (22)  Swallow Criteria for Skilled Therapeutic Interventions Met: demonstrates skilled criteria (22)     Rehab Potential/Prognosis, Swallowing: good, to achieve stated therapy goals (22)  Therapy Frequency (Swallow): PRN (22)  Predicted Duration Therapy Intervention (Days): until discharge (22)                         PPE: surgical mask, gloves, eye protection                SWALLOW EVALUATION (last 72 hours)     SLP Adult Swallow Evaluation     Row Name 22       Rehab Evaluation    Document Type evaluation  -EC    Patient Observations alert;cooperative;agree to therapy  -EC    Patient Effort good  -EC            General Information    Patient Profile Reviewed yes  -EC    Pertinent History Of Current Problem Per H&P: Goran Dunbar is a 72 y.o. male w/pmh of dementia, and hypertension who presented to [facility] on 2022 complaining of speech  difficulties and EMS reports of left sided facial droop.  Patient is unable to provide any past medical history detail.  His family at bedside states that he is seen by physician in [place] and is prescribed amlodipine, metoprolol, potassium and Namenda.  They state he has only been seen at this facility 3 times in those 3 times over the only physicians he has seen in his life.  The daughter at bedside states that he was fine in his normal state of health yesterday all day, and when he went to bed last night.  She states when he awoke he was unable to speak, she did try to feed him but he coughed and choked.  She denies any previous dysphagia aphasia, or stroke in  -EC    Current Method of Nutrition NPO  -EC    Precautions/Limitations, Vision WFL;for purposes of eval  -EC    Precautions/Limitations, Hearing WFL;for purposes of eval  -EC    Prior Level of Function-Swallowing unknown  Pt unable to verbalize at this time  -EC    Plans/Goals Discussed with patient  no family present  -EC            Oral Motor Structure and Function    Dentition Assessment missing teeth  -EC    Secretion Management WNL/WFL  -EC    Mucosal Quality moist, healthy  -EC            Oral Musculature and Cranial Nerve Assessment    Oral Motor General Assessment oral labial or buccal impairment;lingual impairment;vocal impairment  -EC    Oral Labial or Buccal Impairment, Detail, Cranial Nerve VII (Facial): right labial droop  -EC    Lingual Impairment, Detail. Cranial Nerves IX, XII (Glossopharyngeal and Hypoglossal) --  R lingual deviation upon protrusion  -EC    Vocal Impairment, Detail. Cranial Nerve X (Vagus) vocal quality abnormality (see comments)  strained, strangled vocal quality  -EC            General Eating/Swallowing Observations    Respiratory Support Currently in Use room air  -EC    Eating/Swallowing Skills fed by SLP  -EC    Positioning During Eating upright 90 degree;upright in bed  -EC    Utensils Used spoon;straw  -EC     Consistencies Trialed ice chips;thin liquids  -EC            Respiratory    Respiratory Status room air  -EC            Clinical Swallow Eval    Clinical Swallow Evaluation Summary Bedside swallow evaluation completed after pt failed stroke protocol swallow screen d/t facial asymmetry. Pt appears w/dysarthria characterized by strained, strangled vocal quality and expressive aphasia. Pt does follow verbal commands for oral mech exam appropriately. R lingual deviation and R labial droop noted w/OME. Pt administered ice chip, water by spoon and water by straw for purposes of assessment. Pt adequately removes ice and water from spoon and is able to draw liquid via straw. Pt w/mild wet vocal quality noted w/ice and water by spoon. Multiple swallows and throat clearing consistently demonstrated following water by straw. D/t results of bedside swallow evaluation a VFSS is recommended for further investigation of swallow function.  -EC            SLP Evaluation Clinical Impression    SLP Swallowing Diagnosis suspected pharyngeal dysphagia  -EC    Functional Impact risk of aspiration/pneumonia;risk of malnutrition;risk of dehydration  -EC    Rehab Potential/Prognosis, Swallowing good, to achieve stated therapy goals  -EC    Swallow Criteria for Skilled Therapeutic Interventions Met demonstrates skilled criteria  -EC            SLP Treatment Clinical Impressions    Care Plan Review evaluation/treatment results reviewed;patient/other agree to care plan  -EC            Recommendations    Therapy Frequency (Swallow) PRN  -EC    Predicted Duration Therapy Intervention (Days) until discharge  -EC    SLP Diet Recommendation NPO  -EC    Recommended Diagnostics VFSS (MBS)  -EC            Swallow Goals (SLP)    Oral Nutrition/Hydration Goal Selection (SLP) oral nutrition/hydration, SLP goal 1;oral nutrition/hydration, SLP goal 2  -EC            Oral Nutrition/Hydration Goal 1 (SLP)    Oral Nutrition/Hydration Goal 1, SLP The patient  will participate in ongoing assessment of swallow, including re-evaluation clinically and/or including instrumental assessment of swallow if indicated, to further assess swallow function in anticipation to initiate a po diet  -EC    Time Frame (Oral Nutrition/Hydration Goal 1, SLP) 1 day  -EC            Oral Nutrition/Hydration Goal 2 (SLP)    Oral Nutrition/Hydration Goal 2, SLP Pt will maximixe swallow function for least restrictive PO diet, exhibiting no complication associated with dysphagia, adequate PO intake, and demonstrating independent use of swallow compensations  -EC    Time Frame (Oral Nutrition/Hydration Goal 2, SLP) by discharge  -EC          User Key  (r) = Recorded By, (t) = Taken By, (c) = Cosigned By    Initials Name Effective Dates    Abimbola Franklin 06/16/21 -                 EDUCATION  The patient has been educated in the following areas:   Dysphagia (Swallowing Impairment).        SLP GOALS     Row Name 04/13/22 1200             Oral Nutrition/Hydration Goal 1 (SLP)    Oral Nutrition/Hydration Goal 1, SLP The patient will participate in ongoing assessment of swallow, including re-evaluation clinically and/or including instrumental assessment of swallow if indicated, to further assess swallow function in anticipation to initiate a po diet  -EC      Time Frame (Oral Nutrition/Hydration Goal 1, SLP) 1 day  -EC              Oral Nutrition/Hydration Goal 2 (SLP)    Oral Nutrition/Hydration Goal 2, SLP Pt will maximixe swallow function for least restrictive PO diet, exhibiting no complication associated with dysphagia, adequate PO intake, and demonstrating independent use of swallow compensations  -EC      Time Frame (Oral Nutrition/Hydration Goal 2, SLP) by discharge  -EC            User Key  (r) = Recorded By, (t) = Taken By, (c) = Cosigned By    Initials Name Provider Type    Abimbola Franklin Speech and Language Pathologist                   Time Calculation:                Abimbola  Maria Luisa  4/13/2022

## 2022-04-13 NOTE — SIGNIFICANT NOTE
04/13/22 1408   OTHER   Discipline physical therapist   Rehab Time/Intention   Session Not Performed other (see comments)  (Out of room for MRI this AM, no MRI results this PM. PT to f/u tomorrow if appropriate.)   Recommendation   PT - Next Appointment 04/14/22

## 2022-04-13 NOTE — PLAN OF CARE
Goal Outcome Evaluation:  Plan of Care Reviewed With: patient, daughter        Progress: no change  Outcome Evaluation: VFSS completed with full report pending. Severe to profound oropharyngeal dysphagia. Pt demonstrates weakness of all oropharyngeal function, poor control, poor clearance. Unable to effectively clear puree, small bolus thick liquids. Silent penetration with NTL, silent aspiration with thins. Cued cough ineffective in clearing suglottic material.     Recommend: NPO with enteral means of nutrition/hydration/medication administration. Pt may have minimal ice chips under nursing supervision to continue activation of oropharyngeal musculature. Frequent oral care encouraged as pt high risk of aspiration of secretions and all consistencies.     SLP recommends: speech/language/motor speech evaluation and therapy, intensive dysphagia therapy. Pt will require repeat VFSS to initiate PO diet - given severity of swallow impairment, difficult to ascertain timeline, goals of care discussion may appear appropriate pending neurology workup and imaging. Will update plan of care ongoing.

## 2022-04-13 NOTE — CASE MANAGEMENT/SOCIAL WORK
Discharge Planning Assessment  [facility]     Patient Name: Goran Dunbar  MRN: [id number]  Today's Date: 4/13/2022    Admit Date: 4/12/2022     Discharge Needs Assessment     Row Name 04/13/22 1149       Living Environment    People in Home child(zonia), dependent    Name(s) of People in Home Lives w/daugther (Eva)    Current Living Arrangements home    Primary Care Provided by self    Provides Primary Care For no one    Family Caregiver if Needed child(zonia), adult    Quality of Family Relationships helpful;involved;supportive    Able to Return to Prior Arrangements yes    Living Arrangement Comments Lives w/daughter (Eva)       Resource/Environmental Concerns    Resource/Environmental Concerns none    Transportation Concerns none       Transition Planning    Patient/Family Anticipates Transition to home with family    Patient/Family Anticipated Services at Transition rehabilitation services    Transportation Anticipated family or friend will provide       Discharge Needs Assessment    Readmission Within the Last 30 Days no previous admission in last 30 days    Equipment Currently Used at Home none    Concerns to be Addressed discharge planning    Anticipated Changes Related to Illness inability to care for self    Equipment Needed After Discharge none    Outpatient/Agency/Support Group Needs inpatient rehabilitation facility    Discharge Facility/Level of Care Needs rehabilitation facility    Provided Post Acute Provider List? Yes    Post Acute Provider List Inpatient Rehab    Provided Post Acute Provider Quality & Resource List? Yes    Post Acute Provider Quality and Resource List Inpatient Rehab    Delivered To Support Person;Patient    Support Person Merry    Method of Delivery In person               Discharge Plan     Row Name 04/13/22 7493       Plan    Plan Anticipate IP Rehab (Awaiting PT evaluation & Rehab preferences). Lives w/daughter (Eva).    Patient/Family in Agreement with Plan yes     Plan Comments CM to patient's room to complete initial assessment. Patient's daughter (Gisele at bedside). Patient with slurred speech and difficult to understand; therefore, Gisele assisted with cmpletion of initial assessment. Gisele states patient woke up yesterday morning unable to speak clearly; however, before yesterday, he was IADL and spoke clearly. No DME. Patient lives with his daughter (Eva). His other daughter (Maribel) is the main decision maker. Patient does not work or drive. Gisele states the patient did have a couple falls in the past few weeks. Patient agreeable to Meds to Bed and pharmacy was updated. Awaiting PT evaluation; however, left lift of Rehab facilties with Gisele anticipating recommendation for IP Rehab. CM requested Gisele & her sisters select at least three Rehabs as their preference to evaluate for possible IP Rehab.              Continued Care and Services - Admitted Since 4/12/2022    Coordination has not been started for this encounter.       Expected Discharge Date and Time     Expected Discharge Date Expected Discharge Time    Apr 15, 2022          Demographic Summary     Row Name 04/13/22 1148       General Information    Admission Type inpatient    Arrived From emergency department    Required Notices Provided Important Message from Medicare    Referral Source emergency department    Reason for Consult discharge planning    Preferred Language English       Contact Information    Permission Granted to Share Info With                Functional Status     Row Name 04/13/22 1149       Functional Status    Usual Activity Tolerance good    Current Activity Tolerance good       Functional Status, IADL    Medications independent    Meal Preparation independent    Housekeeping independent    Laundry independent    Shopping independent       Mental Status    General Appearance WDL WDL       Mental Status Summary    Recent Changes in Mental Status/Cognitive  Functioning ability to speak clearly    Mental Status Comments Daughter (Gisele) stated the patient awoke yesterday morning and could not speak clearly       Employment/    Employment Status retired            Met with patient in room wearing PPE: mask, face shield/goggles, gloves, gown.      Maintained distance greater than six feet and spent less than 15 minutes in the room.    Jacki Valentine RN, MSN  Care Manager  [phone]

## 2022-04-13 NOTE — CONSULTS
Primary Care Provider: Suzanna Posada PA     Consult requested by:  Nicole Roman NP    Reason for Consultation: Neurological evaluation, Stroke     History taken from: chart family RN    Chief complaint: Speech difficulty        SUBJECTIVE:    History of present illness: Background per H&P:  Goran Dunbar is a 72 y.o. male w/pmh of dementia, and hypertension who presented to [facility] on 4/12/2022 complaining of speech difficulties and EMS reports of left sided facial droop.  Patient is unable to provide any past medical history detail.  His family at bedside states that he is seen by physician in [place] and is prescribed amlodipine, metoprolol, potassium and Namenda.  They state he has only been seen at this facility 3 times in those 3 times over the only physicians he has seen in his life.  The daughter at bedside states that he was fine in his normal state of health yesterday all day, and when he went to bed last night.  She states when he awoke he was unable to speak, she did try to feed him but he coughed and choked.  She denies any previous dysphagia aphasia, or stroke in the past.     - Portions of the above HPI were copied from previous encounters and edited as appropriate. PMH as detailed below.     Pt went to bed two nights ago and was fine, woke up with severe slurred speech and difficulty swallowing. Patient is a poor historian.     Review of Systems   Unable to perform ROS: Acuity of condition          PATIENT HISTORY:  Past Medical History:   Diagnosis Date   • Aphasia 4/12/2022   • Dementia with behavioral disturbance (HCC) 4/12/2022   • Essential hypertension 4/12/2022   • Facial droop 4/12/2022   • Suspected cerebrovascular accident (CVA) 4/12/2022   , No past surgical history on file.,   Family History   Family history unknown: Yes   ,   Social History     Tobacco Use   • Smoking status: Never Smoker   • Smokeless tobacco: Never Used   Substance Use Topics   • Alcohol use:  Never   • Drug use: Never   ,   Prior to Admission medications    Medication Sig Start Date End Date Taking? Authorizing Provider   amLODIPine (NORVASC) 10 MG tablet Take 10 mg by mouth Daily.   Yes Alana Wade MD   memantine (NAMENDA) 5 MG tablet Take 5 mg by mouth Daily.   Yes Alana Wade MD   metoprolol succinate XL (TOPROL-XL) 50 MG 24 hr tablet Take 50 mg by mouth Daily.   Yes Alana Wade MD   potassium chloride (K-DUR,KLOR-CON) 20 MEQ CR tablet Take 20 mEq by mouth Daily.   Yes Alana Wade MD    Allergies:  Patient has no known allergies.    Current Facility-Administered Medications   Medication Dose Route Frequency Provider Last Rate Last Admin   • aspirin tablet 325 mg  325 mg Oral Daily Cindy Roman APRN        Or   • aspirin suppository 300 mg  300 mg Rectal Daily Cindy Roman APRN   300 mg at 04/13/22 0159   • atorvastatin (LIPITOR) tablet 80 mg  80 mg Oral Nightly Cindy Roman APRN       • niCARdipine (CARDENE) 25mg in 250mL NS infusion  5-15 mg/hr Intravenous PRN Cindy Roman APRN       • sodium chloride 0.9 % flush 10 mL  10 mL Intravenous PRN Anuel Abad MD            ________________________________________________________        OBJECTIVE:    PHYSICAL EXAM:    Constitutional: The patient is in no apparent distress, bright awake and alert. There is no shortness of breath.   PSYCHIATRIC: Appropriate   HEENT: Normocephalic, atraumatic.   Chest: Breathing unlabored  Cardiac: Regular rate and rhythm.   Extremities:  No clubbing, cyanosis or edema.    NEUROLOGICAL:    Cognition:   Oriented to name, hospital, did not know his correct age.   Fund of knowledge limited .  Severe dysarthric speech     Cranial nerves;    II - pupils bilaterally equal reacting to light,  No new Visual field deficits;  Fundoscopic exam- Not able to be done, non-dilated exam  III,IV,VI: EOMI with no diplopia  V: Normal facial sensations  VII:  Right facial asymmetry,  VIII: No New hearing abnormality  IX, X, XI: normal gag and shoulder shrug;  XII: tongue is in the midline.    Sensory:  Intact to light touch in all extremities.     Motor: Strength 5/5 bilaterally upper and lower extremities. No involuntary movements present. Normal tone and bulk.    Cerebellar: Finger to nose and mirror movements normal bilaterally.    Gait and balance: Deferred.     Physical exam performed by SORAYA Aguilar.  ________________________________________________________   RESULTS REVIEW:    VITAL SIGNS:   Temp:  [98.4 °F (36.9 °C)-98.6 °F (37 °C)] 98.6 °F (37 °C)  Heart Rate:  [53-95] 53  Resp:  [12-16] 12  BP: (106-140)/(63-79) 113/63     LABS:      Lab 04/13/22 0048 04/12/22  1745   WBC 5.80 5.50   HEMOGLOBIN 12.9* 13.7   HEMATOCRIT 36.7* 38.5   PLATELETS 241 253   NEUTROS ABS  --  4.40   LYMPHS ABS  --  0.60*   MONOS ABS  --  0.40   EOS ABS  --  0.10   MCV 86.4 86.8   PROTIME  --  10.4         Lab 04/13/22  0048 04/12/22  1745   SODIUM 141 139   POTASSIUM 3.6 3.9   CHLORIDE 104 102   CO2 25.0 25.0   ANION GAP 12.0 12.0   BUN 11 12   CREATININE 0.86 0.96   EGFR 92.0 84.0   GLUCOSE 93 112*   CALCIUM 9.0 9.2   TSH  --  1.880         Lab 04/13/22  0048 04/12/22  1745   TOTAL PROTEIN 6.9 7.5   ALBUMIN 3.90 4.20   GLOBULIN 3.0 3.3   ALT (SGPT) 14 11   AST (SGOT) 21 20   BILIRUBIN 0.9 1.0   ALK PHOS 104 116         Lab 04/12/22  1745   PROTIME 10.4   INR 1.01         Lab 04/12/22  1745   CHOLESTEROL 123   LDL CHOL 68   HDL CHOL 36*   TRIGLYCERIDES 99         Lab 04/12/22  1745   ABO TYPING B   RH TYPING Positive   ANTIBODY SCREEN Negative         UA    Urinalysis 4/12/22   Specific [place], UA 1.010   Ketones, UA Negative   Blood, UA Negative   Leukocytes, UA Negative   Nitrite, UA Negative             Lab Results   Component Value Date    TSH 1.880 04/12/2022    LDL 68 04/12/2022       IMAGING STUDIES:  XR Chest 1 View    Result Date: 4/13/2022  No acute cardiopulmonary  process identified.  Electronically Signed By-Dallas Santiago MD On:4/13/2022 8:20 AM This report was finalized on [id number] by  Dallas Santiago MD.    XR Chest 1 View    Result Date: 4/12/2022  No acute cardiopulmonary abnormality is identified.  Electronically Signed By-Parul Bush MD On:4/12/2022 6:30 PM This report was finalized on [id number] by  Parul Bush MD.    CT Head Without Contrast Stroke Protocol    Result Date: 4/12/2022  1.No acute intracranial abnormality is identified on head CT. 2.Mild global volume loss and mild probable chronic small vessel ischemic change. 3.Paranasal sinus disease.  Electronically Signed By-Parul Bush MD On:4/12/2022 6:34 PM This report was finalized on [id number] by  Parul Bush MD.      I reviewed the patient's new clinical results.    ________________________________________________________     PROBLEM LIST:    Suspected cerebrovascular accident (CVA)    Aphasia    Essential hypertension    Dementia with behavioral disturbance (HCC)    Facial droop          Assessment/Plan   ASSESSMENT/PLAN:  1. Multiple acute to subacute tiny strokes within left temporal lobe, left basal ganglia, right frontal lobe. Strokes are embolic within both the right and left MCA territory.   - CT head- no acute findings   - MRI brain-  Incomplete examination as described above. Several tiny subacute infarcts within left temporal lobe, left basal ganglia, and right frontal lobe. No large hemorrhagic transformation.  - CTA head and neck- Cerebral vasculature patent without obvious abrupt, acute-appearing large vessel occlusion. No significant ICA stenosis. Focal moderate grade stenosis mid to distal P2 segment of the left PCA.   - Echo- pending   - EKG: SR, rate 79  - Labs: A1C: P, B12: P, LDL: 68, TSH: 1.880  - Antithrombotics: ASA 81 mg daily   - Statin: Lipitor 40  - PT/OT/ST as appropriate, Neuro checks per protocol, DVT prophylaxis, Stroke education  - Patient failed  video swallow, speech therapy following, will continue to monitor- pt remains NPO.   -- Cardiology consult for work-up: BRIAN and event monitor for 30 days recommended (rule out atrial fibrillation/paroxysmal atrial fibrillation, atrial septal or left ventricular aneurysm, patent [place], thrombus, atheroma, etc.)    2. Hypertension  - Continue home medications  - BP goal 130/90, avoid hypotension    3. Dementia  - Fall risk, on Namenda, management outpatient    4. Modification of stroke risk factors:   - Blood pressure should be less than 130/80 outpatient, HbA1c less than 6.5, LDL less than 70; b12>500 and smoking cessation if applicable. We would be grateful if the primary team / primary care physician would keep a close watch on the above targets.  - Stroke education  - Follow up with neurologist of choice    Will follow.     I discussed the patient's findings and my recommendations with patient, family and nursing staff    Zuri Arroyo, DAYO  04/13/22  09:40 EDT

## 2022-04-13 NOTE — MBS/VFSS/FEES
Acute Care - Speech Language Pathology   Swallow Initial Evaluation  Paul     Patient Name: Goran Dunbar  : 1950  MRN: [id number]  Today's Date: 2022               Admit Date: 2022    Visit Dx:     ICD-10-CM ICD-9-CM   1. Altered mental status, unspecified altered mental status type  R41.82 780.97     Patient Active Problem List   Diagnosis   • Aphasia   • Essential hypertension   • Dementia with behavioral disturbance (HCC)   • Facial droop   • Suspected cerebrovascular accident (CVA)     Past Medical History:   Diagnosis Date   • Aphasia 2022   • Dementia with behavioral disturbance (HCC) 2022   • Essential hypertension 2022   • Facial droop 2022   • Suspected cerebrovascular accident (CVA) 2022     No past surgical history on file.    SLP Recommendation and Plan  VFSS completed with full report pending. Severe to profound oropharyngeal dysphagia. Pt demonstrates weakness of all oropharyngeal function, poor control, poor clearance. Unable to effectively clear puree, small bolus thick liquids. Silent penetration with NTL, silent aspiration with thins. Cued cough ineffective in clearing suglottic material.     Recommend: NPO with enteral means of nutrition/hydration/medication administration. Pt may have minimal ice chips under nursing supervision to continue activation of oropharyngeal musculature. Frequent oral care encouraged as pt high risk of aspiration of secretions and all consistencies.     SLP recommends: speech/language/motor speech evaluation and therapy, intensive dysphagia therapy. Pt will require repeat VFSS to initiate PO diet - given severity of swallow impairment, difficult to ascertain timeline, goals of care discussion may appear appropriate pending neurology workup and imaging. Will update plan of care ongoing.      SWALLOW EVALUATION (last 72 hours)     SLP Adult Swallow Evaluation     Row Name 22 [id number]       Rehab Evaluation    Document  Type evaluation  -AB    Subjective Information complains of  frustration  -AB    Patient Observations alert;cooperative  -AB    Patient/Family/Caregiver Comments/Observations Pt seen in fluoro suite. Cooperative, however does frustrate easily with repetition for following commands and when not understood. intelligibility <25% for single words. Pt writes x2 to respond to simple biographical/med hx questions  -AB    Patient Effort good  -AB    Symptoms Noted During/After Treatment none  -AB            General Information    Patient Profile Reviewed yes  -AB    Pertinent History Of Current Problem --    Current Method of Nutrition NPO  -AB    Precautions/Limitations, Vision WFL;for purposes of eval  -AB    Precautions/Limitations, Hearing WFL;for purposes of eval  -AB    Prior Level of Function-Communication cognitive-linguistic impairment  dx dementia  -AB    Prior Level of Function-Swallowing regular textures;thin liquids  per daughter  -AB    Plans/Goals Discussed with patient;family;agreed upon  daughter  -AB    Barriers to Rehab medically complex;cognitive status  -AB    Patient's Goals for Discharge return to regular diet  -AB    Family Goals for Discharge patient able to return to PO diet  -AB            Pain    Additional Documentation Pain Scale: Numbers Pre/Post-Treatment (Group)  -AB            Pain Scale: Numbers Pre/Post-Treatment    Pretreatment Pain Rating 0/10 - no pain  -AB    Posttreatment Pain Rating 0/10 - no pain  -AB         MBS/VFSS Interpretation    VFSS Summary VFSS completed with pt visualized in a lateral position, requires full feed assist for all trials. Trials included: NTL by tsp/cup/straw, puree, thins by cup. View mildly complicated by pt frequently moving from frame. Lingual movements are slow in initiation, disorganized, limited. Difficulty pulling from straw secondary to lingual weakness. Poor control of bolus with premature spillage of all trials. Minimal (NTL by tsp, thins) ranging  to maximal (NTL by cup, puree) oral residue, requires maximal verbal cues to initiate swallow to clear. Spills from oral residue to pyriform for all consistencies. Swallow initiation is delayed, and with initiation, laryngeal elevation, anterior hyoid excursion severely decreased. Epiglottic inversion limited, results in poor to ineffective clearance and poor vestibular closure.  For cued dry swallow s/p NTL by tsp, subsequent penetration during the swallow, above vocal folds, not ejected (r/t residue). Moderate to maximal oral residue, with spillage to valleculae and penetration during the swallow, above vocal folds, not ejected for NTL by cup. Posterior penetration from residue before the swallow, penetration from laryngeal vestibule not ejected from airway occurs with NTL wash. Posterior penetration before the swallow, large quantity of aspiration during the swallow with thins by cup. No sensation indicating all penetration/aspiration is silent. Difficulty following commands for cued cough, cannot eject all subglottic material. Pharyngeal residuals characterized by: moderate valleculae, minimal pyriform sinus residue (NTL by tsp, cup); moderate valleculae and pyriform (NTL by straw, thins by cup), maximal valleculae and moderate pyriform (puree). Maximal cueing for additional swallow with limited clearance of residue. NTL wash following puree decreases to minimal/moderate, with aforementioned airway entry. Poor bolus clearance from cervical esophagus.  -AB            SLP Evaluation Clinical Impression    SLP Swallowing Diagnosis severe;profound;oral dysphagia;pharyngeal dysphagia  -AB    Functional Impact risk of aspiration/pneumonia;risk of malnutrition;risk of dehydration  -AB    Rehab Potential/Prognosis, Swallowing good, to achieve stated therapy goals  -AB    Swallow Criteria for Skilled Therapeutic Interventions Met demonstrates skilled criteria  -AB            SLP Treatment Clinical Impressions    Care  Plan Review evaluation/treatment results reviewed;care plan/treatment goals reviewed;risks/benefits reviewed;current/potential barriers reviewed;patient/other agree to care plan  -AB    Care Plan Review, Other Participant(s) daughter  reviewed VFSS results with daughter in fluoro suite s/p procedure and explained all recommendations  -AB            Recommendations    Therapy Frequency (Swallow) PRN  -AB    Predicted Duration Therapy Intervention (Days) until discharge  -AB    SLP Diet Recommendation NPO;temporary alternate methods of nutrition/hydration;ice chips between meals after oral care, with supervision  -AB    Recommended Diagnostics SLE/Cog/Motor Speech Evaluation   -AB    Recommended Precautions and Strategies general aspiration precautions  -AB    Oral Care Recommendations Oral Care BID/PRN;Before ice/water  -AB    SLP Rec. for Method of Medication Administration meds via alternate route  -AB    Monitor for Signs of Aspiration none - silent aspiration present   -AB    Anticipated Discharge Disposition (SLP) anticipate therapy at next level of care  -AB            Swallow Goals (SLP)    Oral Nutrition/Hydration Goal Selection (SLP) oral nutrition/hydration, SLP goal 1;oral nutrition/hydration, SLP goal 2  -AB    Lingual Strengthening Goal Selection (SLP) lingual strengthening, SLP goal 1  -AB    Pharyngeal Strengthening Exercise Goal Selection (SLP) pharyngeal strengthening exercise, SLP goal 1  -AB    Additional Documentation pharyngeal strengthening exercise goal selection (SLP);lingual strengthening goal selection (SLP)  -AB            Oral Nutrition/Hydration Goal 1 (SLP)    Oral Nutrition/Hydration Goal 1, SLP LTG: Obtain optimal nutrition in chosen means with focus on quality of life, goals of care, and decreased aspiration risk  -AB    Time Frame (Oral Nutrition/Hydration Goal 1, SLP) by discharge  -AB            Oral Nutrition/Hydration Goal 2 (SLP)    Oral Nutrition/Hydration Goal 2, SLP STG:  Improve following 1 step swallow related commands with PO trials at bedside including: additional swallow, clear throat/re-swallow, etc. in 5/5 opps, NO cues  -AB    Time Frame (Oral Nutrition/Hydration Goal 2, SLP) short term goal (STG)  -AB            Lingual Strengthening Goal 1 (SLP)    Activity (Lingual Strengthening Goal 1, SLP) increase lingual tone/sensation/control/coordination/movement;increase tongue back strength  -AB    Increase Lingual Tone/Sensation/Control/Coordination/Movement lingual movement exercises  -AB    Increase Tongue Back Strength swallow trials;lingual resistance exercises;lingual movement exercises  -AB    [place]/Accuracy (Lingual Strengthening Goal 1, SLP) with minimal cues (75-90% accuracy)  -AB    Time Frame (Lingual Strengthening Goal 1, SLP) short term goal (STG)  -AB            Pharyngeal Strengthening Exercise Goal 1 (SLP)    Activity (Pharyngeal Strengthening Goal 1, SLP) increase superior movement of the hyolaryngeal complex;increase closure at entrance to airway/closure of airway at glottis;increase timing  -AB    Increase Timing prepping - 3 second prep or suck swallow or 3-step swallow  -AB    Increase Superior Movement of the Hyolaryngeal Complex Mendelsohn;hard effortful swallow;chin tuck against resistance (CTAR)  -AB    Increase Closure at Entrance to Airway/Closure of Airway at Glottis supraglottic swallow;falsetto;effortful pitch glide (falsetto + pharyngeal squeeze)  -AB    [place]/Accuracy (Pharyngeal Strengthening Goal 1, SLP) with minimal cues (75-90% accuracy)  -AB    Time Frame (Pharyngeal Strengthening Goal 1, SLP) short term goal (STG)  -AB          User Key  (r) = Recorded By, (t) = Taken By, (c) = Cosigned By    Initials Name Effective Dates    Larissa Villalobos, MS CCC-SLP 08/01/21 -                 EDUCATION  The patient has been educated in the following areas:   Dysphagia (Swallowing Impairment) Oral Care/Hydration NPO rationale.        SLP  GOALS     Row Name 04/13/22 1438       Oral Nutrition/Hydration Goal 1 (SLP)    Oral Nutrition/Hydration Goal 1, SLP LTG: Obtain optimal nutrition in chosen means with focus on quality of life, goals of care, and decreased aspiration risk  -AB    Time Frame (Oral Nutrition/Hydration Goal 1, SLP) by discharge  -AB           Oral Nutrition/Hydration Goal 2 (SLP)    Oral Nutrition/Hydration Goal 2, SLP STG: Improve following 1 step swallow related commands with PO trials at bedside including: additional swallow, clear throat/re-swallow, etc. in 5/5 opps, NO cues  -AB    Time Frame (Oral Nutrition/Hydration Goal 2, SLP) short term goal (STG)  -AB           Lingual Strengthening Goal 1 (SLP)    Activity (Lingual Strengthening Goal 1, SLP) increase lingual tone/sensation/control/coordination/movement;increase tongue back strength  -AB    Increase Lingual Tone/Sensation/Control/Coordination/Movement lingual movement exercises  -AB    Increase Tongue Back Strength swallow trials;lingual resistance exercises;lingual movement exercises  -AB    [place]/Accuracy (Lingual Strengthening Goal 1, SLP) with minimal cues (75-90% accuracy)  -AB    Time Frame (Lingual Strengthening Goal 1, SLP) short term goal (STG)  -AB           Pharyngeal Strengthening Exercise Goal 1 (SLP)    Activity (Pharyngeal Strengthening Goal 1, SLP) increase superior movement of the hyolaryngeal complex;increase closure at entrance to airway/closure of airway at glottis;increase timing  -AB    Increase Timing prepping - 3 second prep or suck swallow or 3-step swallow  -AB    Increase Superior Movement of the Hyolaryngeal Complex Mendelsohn;hard effortful swallow;chin tuck against resistance (CTAR)  -AB    Increase Closure at Entrance to Airway/Closure of Airway at Glottis supraglottic swallow;falsetto;effortful pitch glide (falsetto + pharyngeal squeeze)  -AB    [place]/Accuracy (Pharyngeal Strengthening Goal 1, SLP) with minimal cues (75-90%  accuracy)  -AB    Time Frame (Pharyngeal Strengthening Goal 1, SLP) short term goal (STG)  -AB          User Key  (r) = Recorded By, (t) = Taken By, (c) = Cosigned By    Initials Name Provider Type    Larissa Villalobos, MS CCC-SLP Speech and Language Pathologist                   Time Calculation:       Therapy Charges for Today     Code Description Service Date Service Provider Modifiers Qty    [id number] HC ST MOTION FLUORO EVAL SWALLOW 8 4/13/2022 Larissa José, MS CCC-SLP GN 1             PPE:  Patient was not wearing a face mask during this therapy encounter. Therapist used appropriate personal protective equipment including mask, eye protection and gloves.  Mask used was standard procedure mask. Appropriate PPE was worn during the entire therapy session. The patient coughed during this evaluation. Therapist was within 6 feet for 15 minutes or more during the evaluation. Hand hygiene was completed before and after therapy session. Patient is not in enhanced droplet precautions.       Larissa José MS CCC-SLP  4/13/2022

## 2022-04-14 LAB
BH CV ECHO MEAS - ACS: 1.65 CM
BH CV ECHO MEAS - AO MAX PG: 7.7 MMHG
BH CV ECHO MEAS - AO MEAN PG: 4.2 MMHG
BH CV ECHO MEAS - AO ROOT DIAM: 3.3 CM
BH CV ECHO MEAS - AO V2 MAX: 138.7 CM/SEC
BH CV ECHO MEAS - AO V2 VTI: 29.3 CM
BH CV ECHO MEAS - AVA(I,D): 2.5 CM2
BH CV ECHO MEAS - EDV(CUBED): 98.6 ML
BH CV ECHO MEAS - EDV(MOD-SP4): 62.3 ML
BH CV ECHO MEAS - EF(MOD-SP4): 58 %
BH CV ECHO MEAS - ESV(CUBED): 16 ML
BH CV ECHO MEAS - ESV(MOD-SP4): 26.2 ML
BH CV ECHO MEAS - FS: 45.4 %
BH CV ECHO MEAS - IVS/LVPW: 0.92 CM
BH CV ECHO MEAS - IVSD: 0.8 CM
BH CV ECHO MEAS - LA DIMENSION(2D): 2.9 CM
BH CV ECHO MEAS - LV DIASTOLIC VOL/BSA (35-75): 34.5 CM2
BH CV ECHO MEAS - LV MASS(C)D: 126.1 GRAMS
BH CV ECHO MEAS - LV MAX PG: 6.7 MMHG
BH CV ECHO MEAS - LV MEAN PG: 3.6 MMHG
BH CV ECHO MEAS - LV SYSTOLIC VOL/BSA (12-30): 14.5 CM2
BH CV ECHO MEAS - LV V1 MAX: 129.2 CM/SEC
BH CV ECHO MEAS - LV V1 VTI: 29.5 CM
BH CV ECHO MEAS - LVIDD: 4.6 CM
BH CV ECHO MEAS - LVIDS: 2.5 CM
BH CV ECHO MEAS - LVOT AREA: 2.5 CM2
BH CV ECHO MEAS - LVOT DIAM: 1.79 CM
BH CV ECHO MEAS - LVPWD: 0.87 CM
BH CV ECHO MEAS - MV A MAX VEL: 94.9 CM/SEC
BH CV ECHO MEAS - MV DEC SLOPE: 341.7 CM/SEC2
BH CV ECHO MEAS - MV DEC TIME: 0.21 MSEC
BH CV ECHO MEAS - MV E MAX VEL: 72.2 CM/SEC
BH CV ECHO MEAS - MV E/A: 0.76
BH CV ECHO MEAS - MV MAX PG: 3.9 MMHG
BH CV ECHO MEAS - MV MEAN PG: 1.95 MMHG
BH CV ECHO MEAS - MV V2 VTI: 25.2 CM
BH CV ECHO MEAS - MVA(VTI): 2.9 CM2
BH CV ECHO MEAS - PA ACC TIME: 0.06 SEC
BH CV ECHO MEAS - PA PR(ACCEL): 54.1 MMHG
BH CV ECHO MEAS - PA V2 MAX: 84.4 CM/SEC
BH CV ECHO MEAS - RV MAX PG: 2.24 MMHG
BH CV ECHO MEAS - RV V1 MAX: 74.8 CM/SEC
BH CV ECHO MEAS - RV V1 VTI: 13.4 CM
BH CV ECHO MEAS - RVDD: 2.6 CM
BH CV ECHO MEAS - SI(MOD-SP4): 20 ML/M2
BH CV ECHO MEAS - SV(LVOT): 74.1 ML
BH CV ECHO MEAS - SV(MOD-SP4): 36.1 ML
CRP SERPL-MCNC: 0.37 MG/DL (ref 0–0.5)
LV EF 2D ECHO EST: 70 %
MAXIMAL PREDICTED HEART RATE: 148 BPM
PROCALCITONIN SERPL-MCNC: 0.03 NG/ML (ref 0–0.25)
QT INTERVAL: 386 MS
STRESS TARGET HR: 126 BPM

## 2022-04-14 PROCEDURE — 99232 SBSQ HOSP IP/OBS MODERATE 35: CPT | Performed by: NURSE PRACTITIONER

## 2022-04-14 PROCEDURE — 99223 1ST HOSP IP/OBS HIGH 75: CPT | Performed by: INTERNAL MEDICINE

## 2022-04-14 PROCEDURE — 93010 ELECTROCARDIOGRAM REPORT: CPT | Performed by: INTERNAL MEDICINE

## 2022-04-14 PROCEDURE — 92523 SPEECH SOUND LANG COMPREHEN: CPT

## 2022-04-14 PROCEDURE — 93005 ELECTROCARDIOGRAM TRACING: CPT | Performed by: HOSPITALIST

## 2022-04-14 PROCEDURE — 97165 OT EVAL LOW COMPLEX 30 MIN: CPT

## 2022-04-14 PROCEDURE — 99232 SBSQ HOSP IP/OBS MODERATE 35: CPT | Performed by: HOSPITALIST

## 2022-04-14 PROCEDURE — 97162 PT EVAL MOD COMPLEX 30 MIN: CPT

## 2022-04-14 PROCEDURE — 97530 THERAPEUTIC ACTIVITIES: CPT

## 2022-04-14 PROCEDURE — 25010000002 CYANOCOBALAMIN PER 1000 MCG: Performed by: NURSE PRACTITIONER

## 2022-04-14 RX ORDER — FOLIC ACID 1 MG/1
1 TABLET ORAL DAILY
Status: DISCONTINUED | OUTPATIENT
Start: 2022-04-14 | End: 2022-04-17 | Stop reason: HOSPADM

## 2022-04-14 RX ORDER — CYANOCOBALAMIN 1000 UG/ML
1000 INJECTION, SOLUTION INTRAMUSCULAR; SUBCUTANEOUS
Status: DISCONTINUED | OUTPATIENT
Start: 2022-04-14 | End: 2022-04-17 | Stop reason: HOSPADM

## 2022-04-14 RX ORDER — METOPROLOL TARTRATE 5 MG/5ML
5 INJECTION INTRAVENOUS EVERY 6 HOURS
Status: DISCONTINUED | OUTPATIENT
Start: 2022-04-14 | End: 2022-04-17 | Stop reason: HOSPADM

## 2022-04-14 RX ADMIN — METOPROLOL TARTRATE 5 MG: 5 INJECTION, SOLUTION INTRAVENOUS at 11:37

## 2022-04-14 RX ADMIN — CYANOCOBALAMIN 1000 MCG: 1000 INJECTION, SOLUTION INTRAMUSCULAR at 11:15

## 2022-04-14 RX ADMIN — ASPIRIN 300 MG: 300 SUPPOSITORY RECTAL at 09:51

## 2022-04-14 RX ADMIN — METOPROLOL TARTRATE 5 MG: 5 INJECTION, SOLUTION INTRAVENOUS at 17:35

## 2022-04-14 NOTE — CASE MANAGEMENT/SOCIAL WORK
Continued Stay Note  ABHISHEK Mliler     Patient Name: Goran Dunbar  MRN: [id number]  Today's Date: 4/14/2022    Admit Date: 4/12/2022     Discharge Plan     Row Name 04/14/22 1522       Plan    Plan Pending IP rehab choice    Plan Comments Per bedside nurse family was requesting new list of choices close to there home address, List left at bedside and called and left both daughter Maribel and Gisele MALLORY letting them know I placed it in patients room for them              Met with patient at bedside wearing mask and goggles, Spent less than 15 minutes in room at greater than 6 feet distance.         Allie Noland RN

## 2022-04-14 NOTE — PLAN OF CARE
Goal Outcome Evaluation:  Plan of Care Reviewed With: patient           Outcome Evaluation: Pt is a 73 YO M admitted with CVA, with MRI finding several small subacute infarcts in L temporal lob, L basal ganglia and R frontal lobe. Pt states he lives with his son. Pt this date answering Y/N questions due to aphasia. Stating he typically is independent with all ADLs and ambulation without AD. Pt strength is WFL grossly which leads this therapist to believe that information is accurate. Pt this date sat EOB without assistance, stood and ambulated with MIN A x2 with B HHA. Pt with slight ataxic gait pattern, difficulty placing foot in appropriate position, but ambulated short distance to bedside chair. Pt is significant below stated baseline and recommendation is IP rehab following d/c.

## 2022-04-14 NOTE — THERAPY EVALUATION
Acute Care - Speech Language Pathology Initial Evaluation  [facility]     Patient Name: Goran Dunbar  : 1950  MRN: [id number]  Today's Date: 2022               Admit Date: 2022   Patient was not wearing a face mask during this therapy encounter. Therapist used appropriate personal protective equipment including mask, eye protection and gloves.  Mask used was standard procedure mask. Appropriate PPE was worn during the entire therapy session. Hand hygiene was completed before and after therapy session. Patient is not in enhanced droplet precautions.             Visit Dx:    ICD-10-CM ICD-9-CM   1. Altered mental status, unspecified altered mental status type  R41.82 780.97     Patient Active Problem List   Diagnosis   • Aphasia   • Essential hypertension   • Dementia with behavioral disturbance (HCC)   • Facial droop   • Suspected cerebrovascular accident (CVA)     Past Medical History:   Diagnosis Date   • Aphasia 2022   • Dementia with behavioral disturbance (HCC) 2022   • Essential hypertension 2022   • Facial droop 2022   • Suspected cerebrovascular accident (CVA) 2022     No past surgical history on file.    SLP Recommendation and Plan  SLP Diagnosis: Severe dysarthria (22 1500)        [facility] Criteria for Skilled Therapy Interventions Met: yes (22 1500)           Predicted Duration Therapy Intervention (Days): until discharge (22 1500)       SLP EVALUATION (last 72 hours)     SLP SLC Evaluation     Row Name 22          Document Type evaluation  -CB    Subjective Information no complaints  -CB    Patient Observations alert;cooperative  -CB    Patient Effort good  -CB               Patient Profile Reviewed yes  -CB    Pertinent History Of Current Problem Goran Dunbar is a 72 y.o. male w/pmh of dementia, and hypertension who presented to [facility] on 2022 complaining of speech difficulties and EMS reports of left  sided facial droop.  Patient is unable to provide any past medical history detail.  His family at bedside states that he is seen by physician in [place] and is prescribed amlodipine, metoprolol, potassium and Namenda.  They state he has only been seen at this facility 3 times in those 3 times over the only physicians he has seen in his life.  The daughter at bedside states that he was fine in his normal state of health yesterday all day, and when he went to bed last night.  She states when he awoke he was unable to speak, she did try to feed him but he coughed and choked.  She denies any previous dysphagia aphasia, or stroke in the past.  I will order a chest x-ray to make sure there  aspiration noted, no aspiration pneumonia noted.  WBCs are normal but patient has elevated neutrophils on CBC.  Upon exam patient does have noted right facial weakness, he is unable to communicate verbally, and becomes very frustrated trying to answer questions on paper.  Family at bedside states that this has been ongoing all day when asked any questions he becomes very frustrated and strikes himself in the face.  Patient does report that he fell and hit the right side of his face today, the daughter at bedside who is his caregiver states that she did not see him fall at any time today.  Patient does have erythematous silvana on right side of face.  There are no other obvious injuries noted.  Patient has attempted to get up without assistance, and has attempted to remove IV lines while family at bedside in the emergency department.  Will order a bedside sitter for safety.  -CB               Additional Documentation Pain Scale: FACES Pre/Post-Treatment (Group)  -CB               Pretreatment Pain Rating 0/10 - no pain  -CB    Posttreatment Pain Rating 0/10 - no pain  -CB               Comprehension Assessment/Intervention Auditory Comprehension  -CB               Expression Assessment/Intervention verbal expression;graphic expression   -CB               Repetition words;phrases;WFL  -CB    Confrontational Naming WFL  -CB    Sentence Formulation WFL  -CB               Graphic Expression WFL;dominant hand  -CB    Functional Correspondence WFL  -CB    Graphic Expression, Comment Graphic expression was appropriate and legible.  -CB               Motor Speech Function severe impairment  -CB    Characteristics Consistent with Dysarthria slurred speech;slow rate;decreased intensity;decreased articulation  -CB    Automatic Speech (Communication) days of week;months of year;WFL  However, patient;s intelligibilty was severely impaired.  -CB    Verbal Repetition (Communication) monosyllabic words;words of increasing length;moderate impairment;severe impairment  -CB    Motor Speech, Comment Patient was seen for speech evaluation. Based on recent MRI, it indicated several subacute tiny infarcts within the left temporal lobe, left basal ganglia, right frontal lobe. Overall, speech and language skills were WFL. Patient was oriented to name and month. He was aware he was in the hospital but thought he was in a different city. He was unaware why he was in the hospital other than he had fallen. He was unaware he had a stroke. Patient does have a history with dementia.  However, his motor speech assessment indicated severe dysarthria characterized by marked slowness, general slurring, consistent articulation errors, sound and syllable reductions, impaired production of blends, syllable reduction and longer utterances. Based on Oral mechanism examination he exhibited decrease lingual elevation, retraction and alternating movements. He had poor base of tongue strength as well.  Lingual strength was reduced. Labial protrusion and retraction was decreased as well. Overall, ROM and mobility of oral structures were reduced.  Patient demonstrates severe dysarthria. It is recommended that ST follow to address dysarthria with emphasis on improving strength and mobility of   oral musculature for improvement of speech intelligibilty.  -CB               Orientation Status (Cognition) awareness of basic personal information;person;mild impairment;moderate impairment  Patient knew month and that he was in the hospital but was incorrect with name of hospital and year.  -CB               SLP Diagnosis Severe dysarthria  -CB    Rehab Potential/Prognosis good  -CB    SLC Criteria for Skilled Therapy Interventions Met yes  -CB    Functional Impact difficulty communicating wants, needs  -CB               Therapy Frequency (SLP SLC) PRN  -CB    Predicted Duration Therapy Intervention (Days) until discharge  -CB               Motor Speech/Dysarthria Treatment Objectives Motor Speech/Dysarthria Treatment Objectives (Group)  -CB               Respiratory Support Selection respiratory support, SLP goal 1  -CB    Articulation Selection articulation, SLP goal 1  -CB               Improve Respiratory Support Goal 1 (SLP) sustaining a vowel sound on exhalation;90%;with minimal cues (75-90%)  -CB    Time Frame (Respiratory Support Goal 1, SLP) by discharge  -CB               Improve Articulation Goal 1 (SLP) of specific sounds in words;of specific sounds in phrases;of specific sounds in connected speech;by over-articulating at word level;by over-articulating at phrase level;by over-articulating in connected speech;70%;with minimal cues (75-90%)  -CB    Time Frame (Articulation Goal 1, SLP) short term goal (STG)  -CB          User Key  (r) = Recorded By, (t) = Taken By, (c) = Cosigned By    Initials Name Effective Dates    Vivian Jaimes, SLP 09/21/21 -                    EDUCATION  The patient has been educated in the following areas:     Communication Impairment.           SLP GOALS     Row Name 04/14/22 1500          Oral Nutrition/Hydration Goal 1, SLP --    Time Frame (Oral Nutrition/Hydration Goal 1, SLP) --          Oral Nutrition/Hydration Goal 2, SLP --    Time Frame (Oral  Nutrition/Hydration Goal 2, SLP) --          Activity (Lingual Strengthening Goal 1, SLP) --    Increase Lingual Tone/Sensation/Control/Coordination/Movement --    Increase Tongue Back Strength --    [place]/Accuracy (Lingual Strengthening Goal 1, SLP) --    Time Frame (Lingual Strengthening Goal 1, SLP) --          Activity (Pharyngeal Strengthening Goal 1, SLP) --    Increase Timing --    Increase Superior Movement of the Hyolaryngeal Complex --    Increase Closure at Entrance to Airway/Closure of Airway at Glottis --    [place]/Accuracy (Pharyngeal Strengthening Goal 1, SLP) --    Time Frame (Pharyngeal Strengthening Goal 1, SLP) --          Improve Respiratory Support Goal 1 (SLP) sustaining a vowel sound on exhalation;90%;with minimal cues (75-90%)  -CB    Time Frame (Respiratory Support Goal 1, SLP) by discharge  -CB          Improve Articulation Goal 1 (SLP) of specific sounds in words;of specific sounds in phrases;of specific sounds in connected speech;by over-articulating at word level;by over-articulating at phrase level;by over-articulating in connected speech;70%;with minimal cues (75-90%)  -CB    Time Frame (Articulation Goal 1, SLP) short term goal (STG)  -CB          User Key  (r) = Recorded By, (t) = Taken By, (c) = Cosigned By    Initials Name Provider Type                Vivian Jaimes SLP Speech and Language Pathologist                        Time Calculation:                        [place]  4/14/2022

## 2022-04-14 NOTE — PLAN OF CARE
Goal Outcome Evaluation:  Plan of Care Reviewed With: patient            Patient continues to improve slowly, remains NPO. Blood pressure continues to elevate with IV metoprolol given for control. Has BRIAN on 04/15/2022 with consent signed per daughter. Consult with speech on 04/15/2022 if G tube is needed.

## 2022-04-14 NOTE — SIGNIFICANT NOTE
Post Fall Note    Patient: Goran Dunbar   Location: 255/1    Time of fall: 2104    Date of fall: 4/13/2022    Location of the fall: Patient room 255    Describe the Fall: Bed alarm alarming and CNA Deven responded. Whenever the CNA approached the room the patient was sitting with one knee on the floor beside the bed.    Interventions in place prior to fall: Call Light Within Reach, Encourage Use of Call Light, Bed Side Rails x1, x2, x3, Bed/Chair in Lowest Position, Bed/Chair Locked, Bed Alarm Set, Falls Wrist Band Applied, Falls Gown Applied, Fall Risk Care Plan Active, Non-Skid Footwear, Items within Reach, Room Clutter Free, Adequate Lighting Provided, Lift Equipment Accessible and Fall Risk Signage Present    Was the fall witnessed? No    Where was the patient found?: On floor next to patient's bed     Patient position when found?: kneeling on one knee    If witnessed, what part of the body made contact with the floor or other object? n/a    Injury: No    Is the patient having any pain?: No    Level of Consciousness: awake, alert and confused     Post fall assessment: 22    Physician notified: Yes    Name of physician:page sent to [facility]    Family notified: No    Name of family member notified: will notify     Post fall precautions in place: Patient/ Family Educated, Call Light Within Reach, Encourage Use of Call Light, Bed Side Rails x1, x2, x3, Bed/Chair in Lowest Position, Bed/Chair Locked, Bed Alarm Set, Falls Wrist Band Applied, Falls Gown Applied, Fall Risk Care Plan Active, Non-Skid Footwear, Room Clutter Free, Items within Reach, Adequate Lighting Provided and Falls Risk Signage Present    Teaching provided: Yes          Electronically signed by BLADIMIR BILL RN, 04/13/22, 21:25 EDT.;ll

## 2022-04-14 NOTE — THERAPY EVALUATION
Patient Name: Goran Dunbar  : 1950    MRN: [id number]                              Today's Date: 2022       Admit Date: 2022    Visit Dx:     ICD-10-CM ICD-9-CM   1. Altered mental status, unspecified altered mental status type  R41.82 780.97     Patient Active Problem List   Diagnosis   • Aphasia   • Essential hypertension   • Dementia with behavioral disturbance (HCC)   • Facial droop   • Suspected cerebrovascular accident (CVA)     Past Medical History:   Diagnosis Date   • Aphasia 2022   • Dementia with behavioral disturbance (HCC) 2022   • Essential hypertension 2022   • Facial droop 2022   • Suspected cerebrovascular accident (CVA) 2022     No past surgical history on file.   General Information     Row Name 22 1259          Physical Therapy Time and Intention    Document Type evaluation  -EL     Mode of Treatment physical therapy  -     Row Name 22 1259          General Information    Patient Profile Reviewed yes  -EL     Prior Level of Function independent:;all household mobility;ADL's  per pt answering Y/N questions. But pt not a reliable resource. Pt daughter not present this date.  -     Row Name 22 1259          Living Environment    People in Home child(zonia), adult;other (see comments)  Pt states he lives with his son  -     Row Name 22 1259          Cognition    Orientation Status (Cognition) oriented to;person;disoriented to;time;unable/difficult to assess  able to write his name, unable to provide date  -     Row Name 22 1259          Safety Issues, Functional Mobility    Impairments Affecting Function (Mobility) balance;cognition;coordination  -EL     Cognitive Impairments, Mobility Safety/Performance insight into deficits/self-awareness;problem-solving/reasoning  -EL           User Key  (r) = Recorded By, (t) = Taken By, (c) = Cosigned By    Initials Name Provider Type    Jef Ragsdale, PT Physical Therapist                Mobility     Row Name 04/14/22 1304          Bed Mobility    Bed Mobility supine-sit  -EL     Supine-Sit [place] (Bed Mobility) contact guard  -EL     Row Name 04/14/22 1304          Bed-Chair Transfer    Bed-Chair [place] (Transfers) minimum assist (75% patient effort);2 person assist  -EL     Assistive Device (Bed-Chair Transfers) --  HHA  -EL     Row Name 04/14/22 1304          Sit-Stand Transfer    Sit-Stand [place] (Transfers) minimum assist (75% patient effort);2 person assist  -EL     Assistive Device (Sit-Stand Transfers) --  HHAx2  -EL     Row Name 04/14/22 1304          Gait/Stairs (Locomotion)    [place] Level (Gait) minimum assist (75% patient effort);2 person assist  -EL     Assistive Device (Gait) --  HHAx2  -EL     Distance in Feet (Gait) 8  -EL     Deviations/Abnormal Patterns (Gait) gait speed decreased;ataxic  -EL     Bilateral Gait Deviations forward flexed posture  -EL     Comment, (Gait/Stairs) Will benefit from RWx for continued ambulation.  -EL           User Key  (r) = Recorded By, (t) = Taken By, (c) = Cosigned By    Initials Name Provider Type    EL Jef Valerio, PT Physical Therapist               Obj/Interventions     Row Name 04/14/22 1308          Range of Motion Comprehensive    General Range of Motion bilateral lower extremity ROM WFL  -     Row Name 04/14/22 1308          Strength Comprehensive (MMT)    General Manual Muscle Testing (MMT) Assessment no strength deficits identified  -EL     Comment, General Manual Muscle Testing (MMT) Assessment BLE 5/5  -     Row Name 04/14/22 1308          Balance    Balance Assessment sitting static balance;standing static balance;standing dynamic balance  -EL     Static Sitting Balance modified independence  -EL     Static Standing Balance minimal assist;2-person assist  -EL     Dynamic Standing Balance 2-person assist;minimal assist  -EL           User Key  (r) = Recorded By, (t) = Taken By, (c) = Cosigned By     Initials Name Provider Type    Jef Ragsdale, PT Physical Therapist               Goals/Plan     Row Name 04/14/22 1319          Bed Mobility Goal 1 (PT)    Activity/Assistive Device (Bed Mobility Goal 1, PT) bed mobility activities, all  -EL     [place] Level/Cues Needed (Bed Mobility Goal 1, PT) independent  -EL     Time Frame (Bed Mobility Goal 1, PT) long term goal (LTG);2 weeks  -EL     Row Name 04/14/22 1319          Transfer Goal 1 (PT)    Activity/Assistive Device (Transfer Goal 1, PT) transfers, all;walker, rolling  -EL     [place] Level/Cues Needed (Transfer Goal 1, PT) modified independence  -EL     Time Frame (Transfer Goal 1, PT) long term goal (LTG);2 weeks  -EL     Row Name 04/14/22 1319          Gait Training Goal 1 (PT)    Activity/Assistive Device (Gait Training Goal 1, PT) gait (walking locomotion);walker, rolling  -EL     [place] Level (Gait Training Goal 1, PT) supervision required  -EL     Distance (Gait Training Goal 1, PT) 100  -EL     Time Frame (Gait Training Goal 1, PT) long term goal (LTG)  -EL     Row Name 04/14/22 1319          Therapy Assessment/Plan (PT)    Planned Therapy Interventions (PT) balance training;neuromuscular re-education;bed mobility training;transfer training;gait training;patient/family education;strengthening  -EL           User Key  (r) = Recorded By, (t) = Taken By, (c) = Cosigned By    Initials Name Provider Type    Jef Ragsdale, PT Physical Therapist               Clinical Impression     Row Name 04/14/22 1310          Pain    Pretreatment Pain Rating 0/10 - no pain  -EL     Posttreatment Pain Rating 0/10 - no pain  -EL     Row Name 04/14/22 1310          Plan of Care Review    Plan of Care Reviewed With patient  -EL     Outcome Evaluation Pt is a 73 YO M admitted with CVA, with MRI finding several small subacute infarcts in L temporal lob, L basal ganglia and R frontal lobe. Pt states he lives with his son. Pt this date answering Y/N questions due  to aphasia. Stating he typically is independent with all ADLs and ambulation without AD. Pt strength is WFL grossly which leads this therapist to believe that information is accurate. Pt this date sat EOB without assistance, stood and ambulated with MIN A x2 with B HHA. Pt with slight ataxic gait pattern, difficulty placing foot in appropriate position, but ambulated short distance to bedside chair. Pt is significant below stated baseline and recommendation is IP rehab following d/c.  -EL     Row Name 04/14/22 1310          Therapy Assessment/Plan (PT)    Rehab Potential (PT) good, to achieve stated therapy goals  -EL     Criteria for Skilled Interventions Met (PT) yes  -EL     Therapy Frequency (PT) 3 times/wk  -EL     Predicted Duration of Therapy Intervention (PT) Until d/c  -EL     Row Name 04/14/22 1310          Vital Signs    O2 Delivery Pre Treatment room air  -EL     O2 Delivery Intra Treatment room air  -EL     O2 Delivery Post Treatment room air  -EL     Pre Patient Position Supine  -EL     Intra Patient Position Standing  -EL     Post Patient Position Sitting  -EL     Row Name 04/14/22 1310          Positioning and Restraints    Pre-Treatment Position in bed  -EL     Post Treatment Position chair  -EL     In Chair notified nsg;reclined;call light within reach;encouraged to call for assist;exit alarm on  -EL           User Key  (r) = Recorded By, (t) = Taken By, (c) = Cosigned By    Initials Name Provider Type    Jef Ragsdale, PT Physical Therapist               Outcome Measures     Row Name 04/14/22 1321          How much help from another person do you currently need...    Turning from your back to your side while in flat bed without using bedrails? 4  -EL     Moving from lying on back to sitting on the side of a flat bed without bedrails? 3  -EL     Moving to and from a bed to a chair (including a wheelchair)? 2  -EL     Standing up from a chair using your arms (e.g., wheelchair, bedside chair)? 2  -EL      Climbing 3-5 steps with a railing? 1  -EL     To walk in hospital room? 2  -EL     AM-PAC 6 Clicks Score (PT) 14  -EL     Row Name 04/14/22 1321          Functional Assessment    Outcome Measure Options AM-PAC 6 Clicks Basic Mobility (PT)  -           User Key  (r) = Recorded By, (t) = Taken By, (c) = Cosigned By    Initials Name Provider Type    EL Jef Valerio PT Physical Therapist                             Physical Therapy Education                 Title: PT OT SLP Therapies (Done)     Topic: Physical Therapy (Done)     Point: Mobility training (Done)     Learning Progress Summary           Patient Acceptance, E,TB, VU by EL at 4/14/2022 1323                   Point: Precautions (Done)     Learning Progress Summary           Patient Acceptance, E,TB, VU by EL at 4/14/2022 1323                               User Key     Initials Effective Dates Name Provider Type Discipline     06/23/20 -  Jef Valerio, PT Physical Therapist PT              PT Recommendation and Plan  Planned Therapy Interventions (PT): balance training, neuromuscular re-education, bed mobility training, transfer training, gait training, patient/family education, strengthening  Plan of Care Reviewed With: patient  Outcome Evaluation: Pt is a 73 YO M admitted with CVA, with MRI finding several small subacute infarcts in L temporal lob, L basal ganglia and R frontal lobe. Pt states he lives with his son. Pt this date answering Y/N questions due to aphasia. Stating he typically is independent with all ADLs and ambulation without AD. Pt strength is WFL grossly which leads this therapist to believe that information is accurate. Pt this date sat EOB without assistance, stood and ambulated with MIN A x2 with B HHA. Pt with slight ataxic gait pattern, difficulty placing foot in appropriate position, but ambulated short distance to bedside chair. Pt is significant below stated baseline and recommendation is IP rehab following d/c.     Time  Calculation:    PT Charges     Row Name 04/14/22 1325             Time Calculation    Start Time 0932  -EL      Stop Time 0953  -EL      Time Calculation (min) 21 min  -EL      PT Received On 04/14/22  -EL      PT - Next Appointment 04/15/22  -EL      PT Goal Re-Cert Due Date 04/28/22  -EL            User Key  (r) = Recorded By, (t) = Taken By, (c) = Cosigned By    Initials Name Provider Type    EL Jef Valerio PT Physical Therapist              Therapy Charges for Today     Code Description Service Date Service Provider Modifiers Qty    [id number] HC PT EVAL MOD COMPLEXITY 3 4/14/2022 Jef Valerio, PT GP 1          PT G-Codes  Outcome Measure Options: AM-PAC 6 Clicks Basic Mobility (PT)  AM-PAC 6 Clicks Score (PT): 14    Jef Valerio PT  4/14/2022

## 2022-04-14 NOTE — CONSULTS
Referring Provider: Jewel Wyatt,*  Reason for Consultation:  Stroke.  Hypertension  Cardioembolic source    Patient Care Team:  Suzanna Posada PA as PCP - General (Physician Assistant)    Chief complaint  Difficulty with speech left-sided facial droop.    Subjective .     History of present illness:  Goran Dunbar is a 72 y.o. male who presents with history of hypertension was brought to the hospital with history of speech difficulty and left-sided facial droop.  Patient is a poor historian and is not able to provide good history and most of the history is obtained from the chart and attending nurse.  Patient apparently did not have any other medical problems except for hypertension and has been on amlodipine and metoprolol Namenda.  Patient has some problems with dementia in the past.  Patient is not followed by physician on a regular basis.  Patient apparently went to bed and woke up with problems with speaking.  Patient was found to have probable cardioembolic stroke.  Transesophageal echocardiogram was requested by neurologist.  No other associated aggravating or elevating factors..        ROS      The patient has been without any chest discomfort ,shortness of breath, palpitations, dizziness or syncope.  Denies having any headache ,abdominal pain ,nausea, vomiting , diarrhea constipation, loss of weight or loss of appetite.  Denies having any excessive bruising ,hematuria or blood in the stool.    Review of all systems negative except as indicated      History  Past Medical History:   Diagnosis Date   • Aphasia 4/12/2022   • Dementia with behavioral disturbance (HCC) 4/12/2022   • Essential hypertension 4/12/2022   • Facial droop 4/12/2022   • Suspected cerebrovascular accident (CVA) 4/12/2022       No past surgical history on file.    Family History   Family history unknown: Yes       Social History     Tobacco Use   • Smoking status: Never Smoker   • Smokeless tobacco: Never Used  "  Substance Use Topics   • Alcohol use: Never   • Drug use: Never        Medications Prior to Admission   Medication Sig Dispense Refill Last Dose   • amLODIPine (NORVASC) 10 MG tablet Take 10 mg by mouth Daily.      • memantine (NAMENDA) 5 MG tablet Take 5 mg by mouth Daily.      • metoprolol succinate XL (TOPROL-XL) 50 MG 24 hr tablet Take 50 mg by mouth Daily.      • potassium chloride (K-DUR,KLOR-CON) 20 MEQ CR tablet Take 20 mEq by mouth Daily.            Patient has no known allergies.    Scheduled Meds:aspirin, 325 mg, Oral, Daily   Or  aspirin, 300 mg, Rectal, Daily  atorvastatin, 80 mg, Oral, Nightly  Barium Sulfate, 1 teaspoon(s), Oral, Once in imaging  memantine, 5 mg, Oral, Nightly  polyethylene glycol, 17 g, Oral, Daily  senna-docusate sodium, 2 tablet, Oral, BID      Continuous Infusions:sodium chloride, 75 mL/hr, Last Rate: 75 mL/hr (04/13/22 1207)      PRN Meds:.niCARdipine  •  risperiDONE  •  sodium chloride    Objective     VITAL SIGNS  Vitals:    04/13/22 2255 04/14/22 0255 04/14/22 0300 04/14/22 0625   BP:  140/80  116/61   BP Location:  Left arm  Left arm   Patient Position:  Lying  Lying   Pulse:  85     Resp: 24 12  12   Temp: 98.4 °F (36.9 °C) 97.8 °F (36.6 °C)  97.9 °F (36.6 °C)   TempSrc: Oral Oral  Oral   SpO2:  98%  98%   Weight:   69.5 kg (153 lb 3.5 oz) 70 kg (154 lb 5.2 oz)   Height:           Flowsheet Rows    Flowsheet Row First Filed Value   Admission Height 177.8 cm (70\") Documented at 04/12/2022 1723   Admission Weight 64.7 kg (142 lb 10.2 oz) Documented at 04/12/2022 2331            Intake/Output Summary (Last 24 hours) at 4/14/2022 0638  Last data filed at 4/14/2022 0625  Gross per 24 hour   Intake --   Output 650 ml   Net -650 ml        TELEMETRY: Sinus rhythm    Physical Exam:  The patient is awake and in no distress.  Patient has dysarthria.  Vital signs as noted above.  Head and neck revealed no carotid bruits or jugular venous distention.  No thyromegaly or lymph adenopathy " "is present  Lungs clear.  No wheezing.  Breath sounds are normal bilaterally.  Heart normal first and second heart sounds.No murmur.  No precordial rub is present.  No gallop is present.  Abdomen soft and nontender.  No organomegaly is present.  Extremities with good peripheral pulses without any pedal edema.  Skin warm and dry.  Musculoskeletal system is grossly normal  CNS-dysarthria      Results Review:   I reviewed the patient's new clinical results.  Lab Results (last 24 hours)     Procedure Component Value Units Date/Time    Procalcitonin [[id number]]  (Normal) Collected: 04/13/22 2333    Specimen: Blood Updated: 04/14/22 0114     Procalcitonin 0.03 ng/mL     Narrative:      As a Marker for Sepsis (Non-Neonates):    1. <0.5 ng/mL represents a low risk of severe sepsis and/or septic shock.  2. >2 ng/mL represents a high risk of severe sepsis and/or septic shock.    As a Marker for Lower Respiratory Tract Infections that require antibiotic therapy:    PCT on Admission    Antibiotic Therapy       6-12 Hrs later    >0.5                Strongly Recommended  >0.25 - <0.5        Recommended   0.1 - 0.25          Discouraged              Remeasure/reassess PCT  <0.1                Strongly Discouraged     Remeasure/reassess PCT    As 28 day mortality risk marker: \"Change in Procalcitonin Result\" (>80% or <=80%) if Day 0 (or Day 1) and Day 4 values are available. Refer to http://www.[employer]-pct-calculator.com    Change in PCT <=80%  A decrease of PCT levels below or equal to 80% defines a positive change in PCT test result representing a higher risk for 28-day all-cause mortality of patients diagnosed with severe sepsis for septic shock.    Change in PCT >80%  A decrease of PCT levels of more than 80% defines a negative change in PCT result representing a lower risk for 28-day all-cause mortality of patients diagnosed with severe sepsis or septic shock.       C-reactive Protein [[id number]]  (Normal) Collected: 04/13/22 " 2333    Specimen: Blood Updated: 04/14/22 0107     C-Reactive Protein 0.37 mg/dL     Vitamin B12 [[id number]]  (Normal) Collected: 04/12/22 1745    Specimen: Blood Updated: 04/13/22 1723     Vitamin B-12 235 pg/mL     Narrative:      Results may be falsely increased if patient taking Biotin.      POC Glucose Once [[id number]]  (Abnormal) Collected: 04/13/22 1148    Specimen: Blood Updated: 04/13/22 1148     Glucose 106 mg/dL      Comment: Serial Number: [id number]:  [id number]       Hemoglobin A1c [[id number]]  (Normal) Collected: 04/12/22 2100    Specimen: Blood Updated: 04/13/22 1141     Hemoglobin A1C 5.6 %     Narrative:      Hemoglobin A1C Reference Range:    <5.7 %        Normal  5.7-6.4 %     Increased risk for diabetes  > 6.4 %        Diabetes       These guidelines have been recommended by the American Diabetic Association for Hgb A1c.      The following 2010 guidelines have been recommended by the American Diabetes Association for Hemoglobin A1c.    HBA1c 5.7-6.4% Increased risk for future diabetes (pre-diabetes)  HBA1c     >6.4% Diabetes            Imaging Results (Last 24 Hours)     Procedure Component Value Units Date/Time    MRI Brain Without Contrast [[id number]] Collected: 04/13/22 1310     Updated: 04/13/22 1400    Narrative:      MRI BRAIN WO CONTRAST-     Date of Exam: 4/13/2022 12:25 PM     Indication: Stroke, follow up; R41.82-Altered mental status,  unspecified.     Comparison Exams: CT head from April 12, 2022     Technique: MRI brain without IV contrast     FINDINGS:  The examination is incomplete due to patient's inability to tolerate the  examination due to agitation. Only diffusion and T2-weighted sequences  were obtained.     There are a couple tiny subacute infarcts within the left temporal lobe,  left basal ganglia, and right frontal lobe. No large hemorrhagic  transformation is identified. The ventricles are stable in caliber, with  no midline shift. The basal cisterns appear patent.  There are cerebral  white matter T2 hyperintensities suggesting chronic small vessel  ischemic disease. There is paranasal sinus mucosal disease in the right  frontal, right ethmoid, and left maxillary sinuses. The midline  structures appear intact.       Impression:      1.Incomplete examination as described above.  2.Several tiny subacute infarcts within left temporal lobe, left basal  ganglia, and right frontal lobe. No large hemorrhagic transformation.     Electronically Signed By-Dallas Santiago MD On:4/13/2022 1:19 PM  This report was finalized on [id number] by  Dallas Santiago MD.    CT Angiogram Head [[id number]] Collected: 04/13/22 1238     Updated: 04/13/22 1400    Narrative:         DATE OF EXAM:  4/13/2022 11:22 AM     PROCEDURE:  CT ANGIOGRAM CAROTIDS-, CT ANGIOGRAM HEAD-     INDICATIONS:   72-year-old male presents with altered mental status.     COMPARISON:   No Comparisons Available     TECHNIQUE:  CTA of the head and CTA of the neck was performed after the intravenous  administration of 100 mL Isovue 370. Reconstructed coronal and sagittal  images were also obtained. In addition, a 3 D volume rendered image was  obtained after post processing. Automated exposure control and iterative  reconstruction methods were used. AI analysis of LVO was utilized for  the CTA Head imaging portion of the study.      FINDINGS:     NON-VASCULAR FINDINGS: Visualized portions of the upper lungs are very  grossly unremarkable. There is extensive degenerative change present  throughout the portion of the spine included on study. Visualized  paranasal sinuses suggest partial opacification of ethmoid air cells on  the right side as well some mucosal thickening within the anterior  inferior aspect of the left maxillary sinus.     VASCULAR FINDINGS: The proximal great vessels appear grossly patent.  Both vertebral arteries are patent with the left side clearly dominant.  The right vertebral artery appears to terminate  in PICA. Both common  carotid arteries are tortuous but patent. There is suggestion of minimal  to mild atheromatous involvement in the region of each carotid bulb and  certainly no angiographically significant stenosis identified on either  side utilizing NASCET criteria. Both external carotid arteries are  patent. The remainder of the internal carotid arteries are patent  bilaterally with minimal calcific plaque throughout the region of each  carotid siphon noted.  The visualized cerebral vasculature fail to demonstrate evidence of  abrupt, acute-appearing large vessel occlusion. No aneurysms are  evident. A focal stenosis involving the left posterior cerebral artery  appears to be present, likely mid to distal P2 segment. The etiology of  the stenosis is not entirely clear but most likely atheromatous.  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          Impression:         1. Cerebral vasculature appears patent without obvious abrupt,  acute-appearing large vessel occlusion demonstrated.  2. No angiographic significant proximal internal carotid stenosis  identified on either side.  3. Patent bilateral vertebral arteries left side dominant. Right  vertebral artery appears to terminate in PICA.  4. Focal moderate grade stenosis, mid to distal P2 segment of the left  posterior cerebral artery, likely atheromatous.  5. 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  6. Paranasal sinus disease. Recommend clinical correlation.  7. Additional findings, both vascular and nonvascular, as described.     Electronically Signed By-Tran Easley MD On:4/13/2022 12:50 PM  This report was finalized on [id number] by  Tran Easley MD.    CT Angiogram Carotids [[id number]] Collected: 04/13/22 1238     Updated: 04/13/22 1400    Narrative:         DATE OF EXAM:  4/13/2022 11:22 AM     PROCEDURE:  CT ANGIOGRAM CAROTIDS-, CT ANGIOGRAM HEAD-      INDICATIONS:   72-year-old male presents with altered mental status.     COMPARISON:   No Comparisons Available     TECHNIQUE:  CTA of the head and CTA of the neck was performed after the intravenous  administration of 100 mL Isovue 370. Reconstructed coronal and sagittal  images were also obtained. In addition, a 3 D volume rendered image was  obtained after post processing. Automated exposure control and iterative  reconstruction methods were used. AI analysis of LVO was utilized for  the CTA Head imaging portion of the study.      FINDINGS:     NON-VASCULAR FINDINGS: Visualized portions of the upper lungs are very  grossly unremarkable. There is extensive degenerative change present  throughout the portion of the spine included on study. Visualized  paranasal sinuses suggest partial opacification of ethmoid air cells on  the right side as well some mucosal thickening within the anterior  inferior aspect of the left maxillary sinus.     VASCULAR FINDINGS: The proximal great vessels appear grossly patent.  Both vertebral arteries are patent with the left side clearly dominant.  The right vertebral artery appears to terminate in PICA. Both common  carotid arteries are tortuous but patent. There is suggestion of minimal  to mild atheromatous involvement in the region of each carotid bulb and  certainly no angiographically significant stenosis identified on either  side utilizing NASCET criteria. Both external carotid arteries are  patent. The remainder of the internal carotid arteries are patent  bilaterally with minimal calcific plaque throughout the region of each  carotid siphon noted.  The visualized cerebral vasculature fail to demonstrate evidence of  abrupt, acute-appearing large vessel occlusion. No aneurysms are  evident. A focal stenosis involving the left posterior cerebral artery  appears to be present, likely mid to distal P2 segment. The etiology of  the stenosis is not entirely clear but most likely  atheromatous.  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          Impression:         1. Cerebral vasculature appears patent without obvious abrupt,  acute-appearing large vessel occlusion demonstrated.  2. No angiographic significant proximal internal carotid stenosis  identified on either side.  3. Patent bilateral vertebral arteries left side dominant. Right  vertebral artery appears to terminate in PICA.  4. Focal moderate grade stenosis, mid to distal P2 segment of the left  posterior cerebral artery, likely atheromatous.  5. 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  6. Paranasal sinus disease. Recommend clinical correlation.  7. Additional findings, both vascular and nonvascular, as described.     Electronically Signed By-rTan Easley MD On:4/13/2022 12:50 PM  This report was finalized on [id number] by  Tran Easley MD.    FL Video Swallow With Speech Single Contrast [[id number]] Collected: 04/13/22 1143     Updated: 04/13/22 1149    Narrative:      DATE OF EXAM:  4/13/2022 10:22 AM     PROCEDURE:  FL VIDEO SWALLOW W SPEECH SINGLE-CONTRAST-     INDICATIONS:  dysphagia; R41.82-Altered mental status, unspecified     COMPARISON:  No Comparisons Available     TECHNIQUE:   This examination was performed in conjunction with speech pathology.  Lateral video fluoroscopic evaluation of the swallowing mechanism was  performed while correlate administering to the patient various  consistency food items mixed with barium.     Fluoroscopic Time:   4.7 minutes     Number of Images:  11     FINDINGS:  There was silent aspiration with thin liquid. There was penetration with  nectar. There is premature spillage into the vallecula with thin liquid  and nectar. Some residuals in the vallecula were identified with puree.        Impression:      1.Silent aspiration with thin liquid.  2.Penetration with nectar.  3.Please refer to speech  pathologist report for further details.     Electronically Signed By-Dallas Santiago MD On:4/13/2022 11:47 AM  This report was finalized on [id number] by  Dallas Santiago MD.    XR Chest 1 View [[id number]] Collected: 04/13/22 0820     Updated: 04/13/22 0822    Narrative:      XR CHEST 1 VW-     Date of Exam: 4/13/2022 3:59 AM     Indication: Possible aspiration pneumonia; R41.82-Altered mental status,  unspecified.     Comparison Exams: April 12, 2022     Technique: Single AP chest radiograph     FINDINGS:  The lungs are clear. The heart and mediastinal contours appear normal.  The pulmonary vasculature appears normal. The osseous structures appear  intact.       Impression:      No acute cardiopulmonary process identified.     Electronically Signed By-Dallas Santiago MD On:4/13/2022 8:20 AM  This report was finalized on [id number] by  Dallas Santiago MD.      LAB RESULTS (LAST 7 DAYS)    CBC  Results from last 7 days   Lab Units 04/13/22  0048 04/12/22  1745   WBC 10*3/mm3 5.80 5.50   RBC 10*6/mm3 4.25 4.43   HEMOGLOBIN g/dL 12.9* 13.7   HEMATOCRIT % 36.7* 38.5   MCV fL 86.4 86.8   PLATELETS 10*3/mm3 241 253       BMP  Results from last 7 days   Lab Units 04/13/22  0048 04/12/22  1745   SODIUM mmol/L 141 139   POTASSIUM mmol/L 3.6 3.9   CHLORIDE mmol/L 104 102   CO2 mmol/L 25.0 25.0   BUN mg/dL 11 12   CREATININE mg/dL 0.86 0.96   GLUCOSE mg/dL 93 112*       CMP   Results from last 7 days   Lab Units 04/13/22  0048 04/12/22  1745   SODIUM mmol/L 141 139   POTASSIUM mmol/L 3.6 3.9   CHLORIDE mmol/L 104 102   CO2 mmol/L 25.0 25.0   BUN mg/dL 11 12   CREATININE mg/dL 0.86 0.96   GLUCOSE mg/dL 93 112*   ALBUMIN g/dL 3.90 4.20   BILIRUBIN mg/dL 0.9 1.0   ALK PHOS U/L 104 116   AST (SGOT) U/L 21 20   ALT (SGPT) U/L 14 11         BNP        TROPONIN        CoAg  Results from last 7 days   Lab Units 04/12/22  1745   INR  1.01       Creatinine Clearance  Estimated Creatinine Clearance: 76.9 mL/min (by C-G  formula based on SCr of 0.86 mg/dL).    ABG        Radiology  MRI Brain Without Contrast    Result Date: 4/13/2022  1.Incomplete examination as described above. 2.Several tiny subacute infarcts within left temporal lobe, left basal ganglia, and right frontal lobe. No large hemorrhagic transformation.  Electronically Signed By-Dallas Santiago MD On:4/13/2022 1:19 PM This report was finalized on [id number] by  Dallas Santiago MD.    FL Video Swallow With Speech Single Contrast    Result Date: 4/13/2022  1.Silent aspiration with thin liquid. 2.Penetration with nectar. 3.Please refer to speech pathologist report for further details.  Electronically Signed By-Dallas Santiago MD On:4/13/2022 11:47 AM This report was finalized on [id number] by  Dallas Santiago MD.    XR Chest 1 View    Result Date: 4/13/2022  No acute cardiopulmonary process identified.  Electronically Signed By-Dallas Santiago MD On:4/13/2022 8:20 AM This report was finalized on [id number] by  Dallas Santiago MD.    XR Chest 1 View    Result Date: 4/12/2022  No acute cardiopulmonary abnormality is identified.  Electronically Signed By-Parul Bush MD On:4/12/2022 6:30 PM This report was finalized on [id number] by  Parul Bush MD.    CT Angiogram Carotids    Result Date: 4/13/2022   1. Cerebral vasculature appears patent without obvious abrupt, acute-appearing large vessel occlusion demonstrated. 2. No angiographic significant proximal internal carotid stenosis identified on either side. 3. Patent bilateral vertebral arteries left side dominant. Right vertebral artery appears to terminate in PICA. 4. Focal moderate grade stenosis, mid to distal P2 segment of the left posterior cerebral artery, likely atheromatous. 5. 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 6. Paranasal sinus disease. Recommend clinical correlation. 7. Additional findings, both vascular and nonvascular, as described.  Electronically  Signed By-Tran Easley MD On:4/13/2022 12:50 PM This report was finalized on [id number] by  Tran Easley MD.    CT Angiogram Head    Result Date: 4/13/2022   1. Cerebral vasculature appears patent without obvious abrupt, acute-appearing large vessel occlusion demonstrated. 2. No angiographic significant proximal internal carotid stenosis identified on either side. 3. Patent bilateral vertebral arteries left side dominant. Right vertebral artery appears to terminate in PICA. 4. Focal moderate grade stenosis, mid to distal P2 segment of the left posterior cerebral artery, likely atheromatous. 5. 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 6. Paranasal sinus disease. Recommend clinical correlation. 7. Additional findings, both vascular and nonvascular, as described.  Electronically Signed By-Tran Easley MD On:4/13/2022 12:50 PM This report was finalized on [id number] by  Tran Easley MD.    CT Head Without Contrast Stroke Protocol    Result Date: 4/12/2022  1.No acute intracranial abnormality is identified on head CT. 2.Mild global volume loss and mild probable chronic small vessel ischemic change. 3.Paranasal sinus disease.  Electronically Signed By-Parul Bush MD On:4/12/2022 6:34 PM This report was finalized on [id number] by  Parul Bush MD.      Abnormal  Abnormal Labs Reviewed   COMPREHENSIVE METABOLIC PANEL - Abnormal; Notable for the following components:       Result Value    Glucose 112 (*)     All other components within normal limits    Narrative:     GFR Normal >60                  Chronic Kidney Disease <60                  Kidney Failure <15                     URINALYSIS W/ CULTURE IF INDICATED - Abnormal; Notable for the following components:    Color, UA Dark Yellow (*)     All other components within normal limits    Narrative:     Urine microscopic not indicated.   CBC WITH AUTO DIFFERENTIAL - Abnormal; Notable for the following components:     Neutrophil % 79.9 (*)     Lymphocyte % 11.1 (*)     Lymphocytes, Absolute 0.60 (*)     All other components within normal limits   LIPID PANEL - Abnormal; Notable for the following components:    HDL Cholesterol 36 (*)     All other components within normal limits    Narrative:     Cholesterol Reference Ranges                  (U.S. Department of Health and Human Services ATP III Classifications)                                    Desirable          <200 mg/dL                  Borderline High    200-239 mg/dL                  High Risk          >240 mg/dL                                                      Triglyceride Reference Ranges                  (U.S. Department of Health and Human Services ATP III Classifications)                                    Normal           <150 mg/dL                  Borderline High  150-199 mg/dL                  High             200-499 mg/dL                  Very High        >500 mg/dL                                    HDL Reference Ranges                  (U.S. Department of Health and Human Services ATP III Classifications)                                    Low     <40 mg/dl (major risk factor for CHD)                  High    >60 mg/dl ('negative' risk factor for CHD)                                                                        LDL Reference Ranges                  (U.S. Department of Health and Human Services ATP III Classifications)                                    Optimal          <100 mg/dL                  Near Optimal     100-129 mg/dL                  Borderline High  130-159 mg/dL                  High             160-189 mg/dL                  Very High        >189 mg/dL   CBC (NO DIFF) - Abnormal; Notable for the following components:    Hemoglobin 12.9 (*)     Hematocrit 36.7 (*)     All other components within normal limits   POCT GLUCOSE FINGERSTICK - Abnormal; Notable for the following components:    Glucose 111 (*)     All other components within  normal limits   POCT GLUCOSE FINGERSTICK - Abnormal; Notable for the following components:    Glucose 106 (*)     All other components within normal limits            EKG            I personally viewed and interpreted the patient's EKG/Telemetry data: Sinus rhythm right bundle branch block    ECHOCARDIOGRAM:                Cardiolite (Tc-99m Sestamibi) stress test      OTHER:     Assessment/Plan     Principal Problem:    Suspected cerebrovascular accident (CVA)  Active Problems:    Aphasia    Essential hypertension    Dementia with behavioral disturbance (HCC)    Facial droop  ]]]]]]]]]]]]]]]]]]]]  Impression  ==========  -Recent speech difficulty and left facial droop.    Patient has multiple acute to subacute tiny strokes within left temporal lobe, left basal ganglia, right frontal lobe. Strokes are embolic within both the right and left MCA territory.   - CT head- no acute findings   - MRI brain-  Incomplete examination as described above. Several tiny subacute infarcts within left temporal lobe, left basal ganglia, and right frontal lobe. No large hemorrhagic transformation.  - CTA head and neck- Cerebral vasculature patent without obvious abrupt, acute-appearing large vessel occlusion. No significant ICA stenosis. Focal moderate grade stenosis mid to distal P2 segment of the left PCA.     Echocardiogram 4/14/2022 revealed concentric left ventricle hypertrophy and ejection fraction of 70%.    - Right bundle branch block    - History of hypertension.    - History of dementia.    Denies having any surgical problems in the past.    - Non-smoker    - No known allergies  ============  Plan  ===========  Patient appears to have had probably cardioembolic episode.  Patient has history of hypertension  Patient has right bundle branch block.  Echocardiogram (transthoracic) as above  Patient will benefit from transesophageal echocardiogram.  Risks and benefits pros and cons of the procedure including infection bleeding  esophageal trauma anesthetic risk etc. were discussed with patient  Further plan will depend on patient's progress.  ]]]]]]]]]]]]]]]]]]]]]          Aramis Vilchis MD  04/14/22  06:38 EDT

## 2022-04-14 NOTE — PLAN OF CARE
"Goal Outcome Evaluation:  Plan of Care Reviewed With: patient        Progress: no change  Outcome Evaluation: Pt is a 71 YO M admitted with CVA, with MRI finding several small subacute infarcts in L temporal lob, L basal ganglia and R frontal lobe. Pt states he lives with his son. Pt this date answering Y/N questions due to aphasia. Stating he typically is independent with all ADLs and ambulation without AD. Pt. able to write his name and the year as \"1999\". Completes short ambulatory transfer EOB to armchair w/ min A x 2 to maintain balance secondary to lateral deviations w/ dynamic standing activity. Pt. incontinent of urine and provided max A for toilet hygiene. Pt. will require IP rehab at d/c to address aforementioned deficits, will follow up w/ pt. 3-5x per week at [facility].  "

## 2022-04-14 NOTE — NURSING NOTE
Patient continuing to attempt to get out of bed after fall. Patient educated again on importance of pressing the call light for help and not getting out of bed without assistance. Patient not receptive to education and continues to set off the bed alarm and try to get up without assistance. Sitter placed at bedside for safety.

## 2022-04-14 NOTE — PROGRESS NOTES
[facility] Daily Progress Note    Patient Name: Goran Dunbar  : 1950  MRN: [id number]  Primary Care Physician:  Suzanna Posada PA  Date of admission: 2022      Subjective      Chief Complaint: confusion      Patient Reports:    22: Patient confused.  History of dementia.  MRI with several tiny subacute infarcts within left temporal lobe, left basal ganglia and right frontal lobe thus cardiology consulted for BRIAN.  Failed video swallow eval.  22:  BRIAN tomorrow.  Patient confused.    Review of Systems   Unable to perform ROS: mental status change          Objective      Vitals:   Temp:  [97.2 °F (36.2 °C)-98.4 °F (36.9 °C)] 97.2 °F (36.2 °C)  Heart Rate:  [] 104  Resp:  [12-24] 17  BP: (113-172)/(61-95) 172/95    Physical Exam  HENT:      Head: Normocephalic.      Nose: Nose normal.   Eyes:      General: No scleral icterus.     Extraocular Movements: Extraocular movements intact.      Pupils: Pupils are equal, round, and reactive to light.   Cardiovascular:      Rate and Rhythm: Normal rate and regular rhythm.   Pulmonary:      Effort: Pulmonary effort is normal.      Breath sounds: Normal breath sounds.   Abdominal:      General: Bowel sounds are normal.      Tenderness: There is no abdominal tenderness.   Musculoskeletal:      Cervical back: Normal range of motion.      Comments: Moves all extremities   Lymphadenopathy:      Cervical: No cervical adenopathy.   Skin:     General: Skin is warm.      Findings: No rash.   Neurological:      Mental Status: He is alert. He is confused.   Psychiatric:         Cognition and Memory: Memory is impaired.           Result Review    Result Review:  I have personally reviewed the results from the time of this admission to 2022 10:50 EDT and agree with these findings:  [x]  Laboratory  []  Microbiology  [x]  Radiology  []  EKG/Telemetry   []  Cardiology/Vascular   []  Pathology  [x]  Old  records  []  Other:      Wounds (last 24 hours)     LDA Wound     Row Name               Wound 04/13/22 0421 Bilateral medial coccyx Abrasion    Wound - Properties Group Placement Date: 04/13/22 -KH Placement Time: 0421 -KH Present on Hospital Admission: Y  -KH Side: Bilateral  -KH Orientation: medial  -KH Location: coccyx  -KH Primary Wound Type: Abrasion  -KH      Retired Wound - Properties Group Placement Date: 04/13/22 -KH Placement Time: 0421 -KH Present on Hospital Admission: Y  -KH Side: Bilateral  -KH Orientation: medial  -KH Location: coccyx  -KH Primary Wound Type: Abrasion  -KH      Retired Wound - Properties Group Date first assessed: 04/13/22  -KH Time first assessed: 0421 -KH Present on Hospital Admission: Y  -KH Side: Bilateral  -KH Location: coccyx  -KH Primary Wound Type: Abrasion  -KH            User Key  (r) = Recorded By, (t) = Taken By, (c) = Cosigned By    Initials Name Provider Type    Daija Rosario, RN Registered Nurse                  Assessment/Plan      Brief Patient Summary:  73-year-old male with dementia and admission for slurred speech due to CVA      aspirin, 325 mg, Oral, Daily   Or  aspirin, 300 mg, Rectal, Daily  atorvastatin, 80 mg, Oral, Nightly  Barium Sulfate, 1 teaspoon(s), Oral, Once in imaging  cyanocobalamin, 1,000 mcg, Intramuscular, Q28 Days  memantine, 5 mg, Oral, Nightly  polyethylene glycol, 17 g, Oral, Daily  senna-docusate sodium, 2 tablet, Oral, BID       sodium chloride, 75 mL/hr, Last Rate: 75 mL/hr (04/13/22 1207)         Active Hospital Problems:  Active Hospital Problems    Diagnosis    • **Suspected cerebrovascular accident (CVA)    • Aphasia    • Essential hypertension    • Dementia with behavioral disturbance (HCC)    • Facial droop        Slurred speech due to several tiny subacute infarcts within left temporal lobe, left basal ganglia and right frontal lobe:  -s/p CT head in ED   -s/p CTA head and neck 4/13/2022  -s/p MRI brain 4/13/2022  -Seen  by neurology     Dysphagia:  -s/p failed video swallow eval 4/13/2022    Vitamin B12 deficiency:  -Continue to replete      Dementia with behavioral disturbance:  -Patient on Namenda  -We will add risperidone  -Patient lives with family    Hypertension:  -Recent please started on metoprolol and amlodipine by PCP      DVT prophylaxis:  Mechanical DVT prophylaxis orders are present.    CODE STATUS:    Code Status (Patient has no pulse and is not breathing): CPR (Attempt to Resuscitate)  Medical Interventions (Patient has pulse or is breathing): Full Support      Disposition:  I expect patient to be discharged defer.    This patient has been examined wearing appropriate Personal Protective Equipment and discussed with nursing. 04/14/22      Electronically signed by Jewel Wyatt DO, 04/14/22, 10:50 EDT.  [facility] Hospitalist Team

## 2022-04-14 NOTE — PLAN OF CARE
Problem: Adult Inpatient Plan of Care  Goal: Plan of Care Review  Outcome: Ongoing, Progressing  Goal: Patient-Specific Goal (Individualized)  Outcome: Ongoing, Progressing  Goal: Absence of Hospital-Acquired Illness or Injury  Outcome: Ongoing, Progressing  Intervention: Identify and Manage Fall Risk  Recent Flowsheet Documentation  Taken 4/14/2022 0400 by Jocelyn Muniz RN  Safety Promotion/Fall Prevention:   safety round/check completed   activity supervised   assistive device/personal items within reach   clutter free environment maintained   fall prevention program maintained   nonskid shoes/slippers when out of bed   room organization consistent  Taken 4/14/2022 0200 by Jocelyn Muniz RN  Safety Promotion/Fall Prevention:   safety round/check completed   activity supervised   assistive device/personal items within reach   clutter free environment maintained   fall prevention program maintained   nonskid shoes/slippers when out of bed   room organization consistent  Taken 4/14/2022 0000 by Jocelyn Muniz RN  Safety Promotion/Fall Prevention:   safety round/check completed   activity supervised   assistive device/personal items within reach   clutter free environment maintained   fall prevention program maintained   nonskid shoes/slippers when out of bed   room organization consistent  Taken 4/13/2022 2110 by Jocelyn Muniz RN  Safety Promotion/Fall Prevention:   activity supervised   clutter free environment maintained   assistive device/personal items within reach   fall prevention program maintained   nonskid shoes/slippers when out of bed   room organization consistent   safety round/check completed  Intervention: Prevent Skin Injury  Recent Flowsheet Documentation  Taken 4/13/2022 1950 by Jocelyn Muniz, RN  Skin Protection: adhesive use limited  Intervention: Prevent and Manage VTE (Venous Thromboembolism) Risk  Recent Flowsheet Documentation  Taken 4/14/2022 0000 by Jocelyn Muniz  RN  VTE Prevention/Management:   bilateral   sequential compression devices off  Taken 4/13/2022 1950 by Jocelyn Muniz RN  VTE Prevention/Management:   bilateral   sequential compression devices off   patient refused intervention  Intervention: Prevent Infection  Recent Flowsheet Documentation  Taken 4/14/2022 0400 by Jocelyn Muniz RN  Infection Prevention:   environmental surveillance performed   hand hygiene promoted  Taken 4/14/2022 0200 by Jocelyn Muniz RN  Infection Prevention:   environmental surveillance performed   hand hygiene promoted  Taken 4/14/2022 0000 by Jocelyn Muniz RN  Infection Prevention:   environmental surveillance performed   hand hygiene promoted  Taken 4/13/2022 2110 by Jocelyn Muniz RN  Infection Prevention:   environmental surveillance performed   hand hygiene promoted  Taken 4/13/2022 1950 by Jocelyn Muniz RN  Infection Prevention:   environmental surveillance performed   hand hygiene promoted  Goal: Optimal Comfort and Wellbeing  Outcome: Ongoing, Progressing  Intervention: Provide Person-Centered Care  Recent Flowsheet Documentation  Taken 4/14/2022 0400 by Jocelyn Muniz RN  Trust Relationship/Rapport:   care explained   choices provided  Taken 4/14/2022 0000 by Jocelyn Muniz RN  Trust Relationship/Rapport:   care explained   choices provided  Taken 4/13/2022 2110 by Jocelyn Muniz RN  Trust Relationship/Rapport:   care explained   choices provided   emotional support provided  Taken 4/13/2022 1950 by Jocelyn Muniz RN  Trust Relationship/Rapport:   choices provided   care explained  Goal: Readiness for Transition of Care  Outcome: Ongoing, Progressing     Problem: Fall Injury Risk  Goal: Absence of Fall and Fall-Related Injury  Outcome: Ongoing, Progressing  Intervention: Identify and Manage Contributors  Recent Flowsheet Documentation  Taken 4/14/2022 0200 by Jocelyn Muniz RN  Medication Review/Management: medications reviewed  Taken 4/14/2022  0000 by Jocelyn Muniz RN  Medication Review/Management: medications reviewed  Taken 4/13/2022 2110 by Jocelyn Muniz RN  Medication Review/Management: medications reviewed  Intervention: Promote Injury-Free Environment  Recent Flowsheet Documentation  Taken 4/14/2022 0400 by Jocelyn Muniz RN  Safety Promotion/Fall Prevention:   safety round/check completed   activity supervised   assistive device/personal items within reach   clutter free environment maintained   fall prevention program maintained   nonskid shoes/slippers when out of bed   room organization consistent  Taken 4/14/2022 0200 by Jocelyn Muniz RN  Safety Promotion/Fall Prevention:   safety round/check completed   activity supervised   assistive device/personal items within reach   clutter free environment maintained   fall prevention program maintained   nonskid shoes/slippers when out of bed   room organization consistent  Taken 4/14/2022 0000 by Jocelyn Muniz RN  Safety Promotion/Fall Prevention:   safety round/check completed   activity supervised   assistive device/personal items within reach   clutter free environment maintained   fall prevention program maintained   nonskid shoes/slippers when out of bed   room organization consistent  Taken 4/13/2022 2110 by Jocelyn Muniz RN  Safety Promotion/Fall Prevention:   activity supervised   clutter free environment maintained   assistive device/personal items within reach   fall prevention program maintained   nonskid shoes/slippers when out of bed   room organization consistent   safety round/check completed     Problem: Skin Injury Risk Increased  Goal: Skin Health and Integrity  Outcome: Ongoing, Progressing  Intervention: Optimize Skin Protection  Recent Flowsheet Documentation  Taken 4/13/2022 1950 by Jocelyn Muniz RN  Pressure Reduction Techniques: frequent weight shift encouraged  Pressure Reduction Devices:   pressure-redistributing mattress utilized   positioning  supports utilized  Skin Protection: adhesive use limited   Goal Outcome Evaluation:

## 2022-04-14 NOTE — THERAPY EVALUATION
Patient Name: Goran Dunbar  : 1950    MRN: [id number]                              Today's Date: 2022       Admit Date: 2022    Visit Dx:     ICD-10-CM ICD-9-CM   1. Altered mental status, unspecified altered mental status type  R41.82 780.97     Patient Active Problem List   Diagnosis   • Aphasia   • Essential hypertension   • Dementia with behavioral disturbance (HCC)   • Facial droop   • Suspected cerebrovascular accident (CVA)     Past Medical History:   Diagnosis Date   • Aphasia 2022   • Dementia with behavioral disturbance (HCC) 2022   • Essential hypertension 2022   • Facial droop 2022   • Suspected cerebrovascular accident (CVA) 2022     No past surgical history on file.   General Information     Row Name 22 1610          OT Time and Intention    Document Type evaluation  -MP     Mode of Treatment occupational therapy  -MP     Row Name 22 1610          General Information    Patient Profile Reviewed yes  -MP     Prior Level of Function independent:  -MP     Existing Precautions/Restrictions no known precautions/restrictions  -MP     Row Name 22 1610          Living Environment    People in Home child(zonia), adult  -MP     Row Name 22 1610          Cognition    Orientation Status (Cognition) oriented to;person;disoriented to;time;unable/difficult to assess  -MP     Row Name 22 1610          Safety Issues, Functional Mobility    Safety Issues Affecting Function (Mobility) insight into deficits/self-awareness  -MP     Impairments Affecting Function (Mobility) balance;cognition;coordination  -MP           User Key  (r) = Recorded By, (t) = Taken By, (c) = Cosigned By    Initials Name Provider Type    MP Haresh Horton OT Occupational Therapist                 Mobility/ADL's     Row Name 22 1614          Bed Mobility    Bed Mobility supine-sit  -MP     Supine-Sit [place] (Bed Mobility) contact guard  -MP     Row Name  04/14/22 1614          Transfers    Bed-Chair Cross (Transfers) minimum assist (75% patient effort);2 person assist  -MP     Sit-Stand Cross (Transfers) minimum assist (75% patient effort);2 person assist  -MP     Row Name 04/14/22 1614          Activities of Daily Living    BADL Assessment/Intervention toileting  -MP     Row Name 04/14/22 1614          Toileting Assessment/Training    Cross Level (Toileting) toileting skills;maximum assist (25% patient effort)  -MP           User Key  (r) = Recorded By, (t) = Taken By, (c) = Cosigned By    Initials Name Provider Type    Haresh Hsu OT Occupational Therapist               Obj/Interventions     Row Name 04/14/22 1615          Range of Motion Comprehensive    Comment, General Range of Motion BUE WFL  -MP     Row Name 04/14/22 1615          Strength Comprehensive (MMT)    Comment, General Manual Muscle Testing (MMT) Assessment BUE WFL  -MP     Row Name 04/14/22 1615          Balance    Static Standing Balance minimal assist;2-person assist  -MP     Dynamic Standing Balance minimal assist;2-person assist  -MP     Balance Interventions sitting;standing;sit to stand;supported;static;dynamic  -MP           User Key  (r) = Recorded By, (t) = Taken By, (c) = Cosigned By    Initials Name Provider Type    Haresh Hsu OT Occupational Therapist               Goals/Plan     Row Name 04/14/22 1623          Bed Mobility Goal 1 (OT)    Activity/Assistive Device (Bed Mobility Goal 1, OT) bed mobility activities, all  -MP     Cross Level/Cues Needed (Bed Mobility Goal 1, OT) contact guard required  -MP     Time Frame (Bed Mobility Goal 1, OT) long term goal (LTG);2 weeks  -MP     Row Name 04/14/22 1623          Transfer Goal 1 (OT)    Activity/Assistive Device (Transfer Goal 1, OT) sit-to-stand/stand-to-sit;toilet  -MP     Cross Level/Cues Needed (Transfer Goal 1, OT) contact guard required  -MP     Time Frame (Transfer Goal 1,  "OT) long term goal (LTG);2 weeks  -MP     Row Name 04/14/22 1623          Dressing Goal 1 (OT)    Activity/Device (Dressing Goal 1, OT) lower body dressing  -MP     [place]/Cues Needed (Dressing Goal 1, OT) minimum assist (75% or more patient effort)  -MP     Time Frame (Dressing Goal 1, OT) long term goal (LTG);2 weeks  -MP           User Key  (r) = Recorded By, (t) = Taken By, (c) = Cosigned By    Initials Name Provider Type    MP Haresh Horton, SAMMY Occupational Therapist               Clinical Impression     Row Name 04/14/22 1616          Pain Assessment    Pretreatment Pain Rating 0/10 - no pain  -MP     Posttreatment Pain Rating 0/10 - no pain  -MP     Row Name 04/14/22 1616          Plan of Care Review    Plan of Care Reviewed With patient  -MP     Progress no change  -MP     Outcome Evaluation Pt is a 73 YO M admitted with CVA, with MRI finding several small subacute infarcts in L temporal lob, L basal ganglia and R frontal lobe. Pt states he lives with his son. Pt this date answering Y/N questions due to aphasia. Stating he typically is independent with all ADLs and ambulation without AD. Pt. able to write his name and the year as \"1999\". Completes short ambulatory transfer EOB to armchair w/ min A x 2 to maintain balance secondary to lateral deviations w/ dynamic standing activity. Pt. incontinent of urine and provided max A for toilet hygiene. Pt. will require IP rehab at d/c to address aforementioned deficits, will follow up w/ pt. 3-5x per week at [facility].  -MP     Row Name 04/14/22 1616          Therapy Assessment/Plan (OT)    Rehab Potential (OT) good, to achieve stated therapy goals  -MP     Criteria for Skilled Therapeutic Interventions Met (OT) yes;skilled treatment is necessary  -MP     Therapy Frequency (OT) 5 times/wk  -MP     Row Name 04/14/22 1616          Therapy Plan Review/Discharge Plan (OT)    Anticipated Discharge Disposition (OT) inpatient rehabilitation facility  -MP     Row Name " 04/14/22 1616          Vital Signs    Pre Patient Position Supine  -MP     Intra Patient Position Standing  -MP     Post Patient Position Sitting  -MP     Row Name 04/14/22 1616          Positioning and Restraints    Pre-Treatment Position in bed  -MP     Post Treatment Position chair  -MP     In Chair sitting;call light within reach;encouraged to call for assist;exit alarm on  -MP           User Key  (r) = Recorded By, (t) = Taken By, (c) = Cosigned By    Initials Name Provider Type    Haresh Hsu OT Occupational Therapist               Outcome Measures     Row Name 04/14/22 1321          How much help from another person do you currently need...    Turning from your back to your side while in flat bed without using bedrails? 4  -EL     Moving from lying on back to sitting on the side of a flat bed without bedrails? 3  -EL     Moving to and from a bed to a chair (including a wheelchair)? 2  -EL     Standing up from a chair using your arms (e.g., wheelchair, bedside chair)? 2  -EL     Climbing 3-5 steps with a railing? 1  -EL     To walk in hospital room? 2  -EL     AM-PAC 6 Clicks Score (PT) 14  -EL     Row Name 04/14/22 1321          Functional Assessment    Outcome Measure Options AM-PAC 6 Clicks Basic Mobility (PT)  -EL           User Key  (r) = Recorded By, (t) = Taken By, (c) = Cosigned By    Initials Name Provider Type    Jef Ragsdale PT Physical Therapist                Occupational Therapy Education                 Title: PT OT SLP Therapies (In Progress)     Topic: Occupational Therapy (In Progress)     Point: ADL training (Not Started)     Description:   Instruct learner(s) on proper safety adaptation and remediation techniques during self care or transfers.   Instruct in proper use of assistive devices.              Learner Progress:  Not documented in this visit.          Point: Home exercise program (Not Started)     Description:   Instruct learner(s) on appropriate technique for  "monitoring, assisting and/or progressing therapeutic exercises/activities.              Learner Progress:  Not documented in this visit.          Point: Precautions (Not Started)     Description:   Instruct learner(s) on prescribed precautions during self-care and functional transfers.              Learner Progress:  Not documented in this visit.          Point: Body mechanics (Done)     Description:   Instruct learner(s) on proper positioning and spine alignment during self-care, functional mobility activities and/or exercises.              Learning Progress Summary           Patient Acceptance, E,TB, VU by  at 4/14/2022 1626                               User Key     Initials Effective Dates Name Provider Type Discipline     06/16/21 -  Haresh Horton OT Occupational Therapist OT              OT Recommendation and Plan  Therapy Frequency (OT): 5 times/wk  Plan of Care Review  Plan of Care Reviewed With: patient  Progress: no change  Outcome Evaluation: Pt is a 71 YO M admitted with CVA, with MRI finding several small subacute infarcts in L temporal lob, L basal ganglia and R frontal lobe. Pt states he lives with his son. Pt this date answering Y/N questions due to aphasia. Stating he typically is independent with all ADLs and ambulation without AD. Pt. able to write his name and the year as \"1999\". Completes short ambulatory transfer EOB to armchair w/ min A x 2 to maintain balance secondary to lateral deviations w/ dynamic standing activity. Pt. incontinent of urine and provided max A for toilet hygiene. Pt. will require IP rehab at d/c to address aforementioned deficits, will follow up w/ pt. 3-5x per week at [facility].     Time Calculation:    Time Calculation- OT     Row Name 04/14/22 1626             Time Calculation- OT    OT Start Time 0932  -      OT Stop Time 0953  -      OT Time Calculation (min) 21 min  -      Total Timed Code Minutes- OT 10 minute(s)  -      OT Received On 04/14/22  -      " OT - Next Appointment 04/15/22  -SAFIA      OT Goal Re-Cert Due Date 04/28/22  -SAFIA            User Key  (r) = Recorded By, (t) = Taken By, (c) = Cosigned By    Initials Name Provider Type    Haresh Hsu OT Occupational Therapist              Therapy Charges for Today     Code Description Service Date Service Provider Modifiers Qty    [id number]  OT EVAL LOW COMPLEXITY 3 4/14/2022 Haresh Horton OT GO 1    [id number]  OT THERAPEUTIC ACT EA 15 MIN 4/14/2022 Haresh Horton OT GO 1               Haresh Horton OT  4/14/2022

## 2022-04-14 NOTE — PROGRESS NOTES
LOS: 2 days     Chief Complaint: speech disturbance        SUBJECTIVE:    History taken from: chart RN    Interval History: Per nursing staff patient has had intermittent episodes of agitation and confusion. Patient is able to communicate more by writing due to his aphasia.     Review of Systems   Unable to perform ROS: Mental status change        Pertinent PMH:  has a past medical history of Aphasia (4/12/2022), Dementia with behavioral disturbance (HCC) (4/12/2022), Essential hypertension (4/12/2022), Facial droop (4/12/2022), and Suspected cerebrovascular accident (CVA) (4/12/2022).   ________________________________________________     OBJECTIVE:    On exam:  GENERAL: NAD  CARDIO: RRR  NEURO:  Oriented to name  Severe dysarthria  Expressive aphasia  EOMI, PERRL, no visual field deficits  Left facial asymmetry  Strength 5/5 and equal in all extremities  No ataxia  ________________________________________________   RESULTS REVIEW    VITAL SIGNS:  Temp:  [97.6 °F (36.4 °C)-98.4 °F (36.9 °C)] 97.9 °F (36.6 °C)  Heart Rate:  [79-94] 85  Resp:  [12-24] 12  BP: (113-146)/(61-83) 116/61    LABS:       Lab 04/13/22  2333 04/13/22 0048 04/12/22  1745   WBC  --  5.80 5.50   HEMOGLOBIN  --  12.9* 13.7   HEMATOCRIT  --  36.7* 38.5   PLATELETS  --  241 253   NEUTROS ABS  --   --  4.40   LYMPHS ABS  --   --  0.60*   MONOS ABS  --   --  0.40   EOS ABS  --   --  0.10   MCV  --  86.4 86.8   CRP 0.37  --   --    PROCALCITONIN 0.03  --   --    PROTIME  --   --  10.4         Lab 04/13/22  0048 04/12/22  2100 04/12/22  1745   SODIUM 141  --  139   POTASSIUM 3.6  --  3.9   CHLORIDE 104  --  102   CO2 25.0  --  25.0   ANION GAP 12.0  --  12.0   BUN 11  --  12   CREATININE 0.86  --  0.96   EGFR 92.0  --  84.0   GLUCOSE 93  --  112*   CALCIUM 9.0  --  9.2   HEMOGLOBIN A1C  --  5.6  --    TSH  --   --  1.880         Lab 04/13/22  0048 04/12/22  1745   TOTAL PROTEIN 6.9 7.5   ALBUMIN 3.90 4.20   GLOBULIN 3.0 3.3   ALT (SGPT) 14 11    AST (SGOT) 21 20   BILIRUBIN 0.9 1.0   ALK PHOS 104 116         Lab 04/12/22  1745   PROTIME 10.4   INR 1.01         Lab 04/12/22  1745   CHOLESTEROL 123   LDL CHOL 68   HDL CHOL 36*   TRIGLYCERIDES 99         Lab 04/12/22  1745   VITAMIN B 12 235   ABO TYPING B   RH TYPING Positive   ANTIBODY SCREEN Negative         UA    Urinalysis 4/12/22   Specific [place], UA 1.010   Ketones, UA Negative   Blood, UA Negative   Leukocytes, UA Negative   Nitrite, UA Negative             Lab Results   Component Value Date    TSH 1.880 04/12/2022    LDL 68 04/12/2022    HGBA1C 5.6 04/12/2022    [id number] 235 04/12/2022         IMAGING STUDIES:  MRI Brain Without Contrast    Result Date: 4/13/2022  1.Incomplete examination as described above. 2.Several tiny subacute infarcts within left temporal lobe, left basal ganglia, and right frontal lobe. No large hemorrhagic transformation.  Electronically Signed By-Dallas Santiago MD On:4/13/2022 1:19 PM This report was finalized on [id number] by  Dallas Santiago MD.    FL Video Swallow With Speech Single Contrast    Result Date: 4/13/2022  1.Silent aspiration with thin liquid. 2.Penetration with nectar. 3.Please refer to speech pathologist report for further details.  Electronically Signed By-Dallas Santiago MD On:4/13/2022 11:47 AM This report was finalized on [id number] by  Dallas Santiago MD.    XR Chest 1 View    Result Date: 4/13/2022  No acute cardiopulmonary process identified.  Electronically Signed By-Dallas Santiago MD On:4/13/2022 8:20 AM This report was finalized on [id number] by  Dallas Santiago MD.    XR Chest 1 View    Result Date: 4/12/2022  No acute cardiopulmonary abnormality is identified.  Electronically Signed By-Parul Bush MD On:4/12/2022 6:30 PM This report was finalized on [id number] by  Parul Bush MD.    CT Angiogram Carotids    Result Date: 4/13/2022   1. Cerebral vasculature appears patent without obvious abrupt, acute-appearing  large vessel occlusion demonstrated. 2. No angiographic significant proximal internal carotid stenosis identified on either side. 3. Patent bilateral vertebral arteries left side dominant. Right vertebral artery appears to terminate in PICA. 4. Focal moderate grade stenosis, mid to distal P2 segment of the left posterior cerebral artery, likely atheromatous. 5. 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 6. Paranasal sinus disease. Recommend clinical correlation. 7. Additional findings, both vascular and nonvascular, as described.  Electronically Signed By-Tran Easley MD On:4/13/2022 12:50 PM This report was finalized on [id number] by  Tran Easley MD.    CT Angiogram Head    Result Date: 4/13/2022   1. Cerebral vasculature appears patent without obvious abrupt, acute-appearing large vessel occlusion demonstrated. 2. No angiographic significant proximal internal carotid stenosis identified on either side. 3. Patent bilateral vertebral arteries left side dominant. Right vertebral artery appears to terminate in PICA. 4. Focal moderate grade stenosis, mid to distal P2 segment of the left posterior cerebral artery, likely atheromatous. 5. 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 6. Paranasal sinus disease. Recommend clinical correlation. 7. Additional findings, both vascular and nonvascular, as described.  Electronically Signed By-Tran Easley MD On:4/13/2022 12:50 PM This report was finalized on [id number] by  Tran Easley MD.    CT Head Without Contrast Stroke Protocol    Result Date: 4/12/2022  1.No acute intracranial abnormality is identified on head CT. 2.Mild global volume loss and mild probable chronic small vessel ischemic change. 3.Paranasal sinus disease.  Electronically Signed By-Parul Bush MD On:4/12/2022 6:34 PM This report was finalized on [id number] by  Parul Bush MD.      I reviewed the patient's new clinical  results.    ________________________________________________      PROBLEM LIST:    Suspected cerebrovascular accident (CVA)    Aphasia    Essential hypertension    Dementia with behavioral disturbance (HCC)    Facial droop        Assessment/Plan   ASSESSMENT/PLAN:  1. Multiple acute to subacute tiny strokes within left temporal lobe, left basal ganglia, right frontal lobe. Strokes are embolic within both the right and left MCA territory.   - CT head- no acute findings   - MRI brain-  Incomplete examination as described above. Several tiny subacute infarcts within left temporal lobe, left basal ganglia, and right frontal lobe. No large hemorrhagic transformation.  - CTA head and neck- Cerebral vasculature patent without obvious abrupt, acute-appearing large vessel occlusion. No significant ICA stenosis. Focal moderate grade stenosis mid to distal P2 segment of the left PCA.   - Echo- pending read   - EKG: SR, rate 79  - Labs: A1C: 5.6, B12: 235, LDL: 68, TSH: 1.880  - Antithrombotics: ASA 81 mg daily for now- Patient may need   - Statin: Lipitor 40  - PT/OT/ST as appropriate, Neuro checks per protocol, DVT prophylaxis, Stroke education  - Patient failed video swallow, speech therapy following, will continue to monitor- pt remains NPO.   -- Cardiology consult for work-up: BRIAN and event monitor for 30 days recommended (rule out atrial fibrillation/paroxysmal atrial fibrillation, atrial septal or left ventricular aneurysm, patent [place], thrombus, atheroma, etc.)     2. Hypertension  - Continue home medications  - BP goal 130/90, avoid hypotension     3. Dementia  - Fall risk, on Namenda, management outpatient    4.  Vitamin B12 deficiency  - Replacement ordered  - Recommend monthly B12 injections outpt or 1000 mcg p.o. daily    5. Modification of stroke risk factors:   - Blood pressure should be less than 130/80 outpatient, HbA1c less than 6.5, LDL less than 70; b12>500 and smoking cessation if applicable. We  would be grateful if the primary team / primary care physician would keep a close watch on the above targets.  - Stroke education  - Follow up with neurologist of choice     Will follow up on BRIAN results and Cardiology recommendations.       DAYO Lewis  04/14/22  10:05 EDT

## 2022-04-14 NOTE — PLAN OF CARE
Goal Outcome Evaluation:      Patient was seen for speech evaluation. Based on recent MRI, it indicated several subacute tiny infarcts within the left temporal lobe, left basal ganglia, right frontal lobe. Overall, speech and language skills were WFL. Patient was oriented to name and month. He was aware he was in the hospital but thought he was in a different city. He was unaware why he was in the hospital other than he had fallen. He was unaware he had a stroke. Patient does have a history with dementia.  However, his motor speech assessment indicated severe dysarthria characterized by marked slowness, general slurring, consistent articulation errors, sound and syllable reductions, impaired production of blends, syllable reduction and longer utterances. Based on Oral mechanism examination he exhibited decrease lingual elevation, retraction and alternating movements. He had poor base of tongue strength as well.  Lingual strength was reduced. Labial protrusion and retraction was decreased as well. Overall, ROM and mobility of oral structures were reduced.  Patient demonstrates severe dysarthria. It is recommended that ST follow to address dysarthria with emphasis on improving strength and mobility of  oral musculature for improvement of speech intelligibilty

## 2022-04-14 NOTE — PHARMACY RECOMMENDATION
Pharmacist fall medication review    Subjective:  Goran Dunbar is a 72 y.o.male admitted with a stroke. Pharmacist to review medication list for high risk medications associated with falls and potential drug-drug interactions.    PMH: Dementia    Description of the Event: Patient was attempting to get up to go to the bathroom and fell. Patient is reportedly still very weak after his CVA. Fall alert received at 21:09.      Assessment:  Current medication list contains medication(s) that are considered high risk for falls. High risk medication class for falls: Anticonvulsants and Antipsychotics. No harmful drug interactions that could increase risk for falls identified.    Medications Administered within last 24 hours:  Anticonvulsants** and Antiphychotics**    Ativan 1mg and Haldol 1mg one time doses were administered at noon    Plan:  It is possible that the ativan and haldol played some role in this patient's fall contributing to his already weakened state. They were one time doses and so there are no more future active doses on the protocol. I do not recommend any current changes to the patient's medication regimen.        Narciso Gore [facility]  04/13/22 21:49 EDT

## 2022-04-15 ENCOUNTER — INPATIENT HOSPITAL (OUTPATIENT)
Dept: URBAN - METROPOLITAN AREA HOSPITAL 84 | Facility: HOSPITAL | Age: 72
End: 2022-04-15

## 2022-04-15 ENCOUNTER — ANESTHESIA EVENT (OUTPATIENT)
Dept: GASTROENTEROLOGY | Facility: HOSPITAL | Age: 72
End: 2022-04-15

## 2022-04-15 ENCOUNTER — ANESTHESIA (OUTPATIENT)
Dept: CARDIOLOGY | Facility: HOSPITAL | Age: 72
End: 2022-04-15

## 2022-04-15 ENCOUNTER — ANESTHESIA (OUTPATIENT)
Dept: GASTROENTEROLOGY | Facility: HOSPITAL | Age: 72
End: 2022-04-15

## 2022-04-15 ENCOUNTER — ANESTHESIA EVENT (OUTPATIENT)
Dept: CARDIOLOGY | Facility: HOSPITAL | Age: 72
End: 2022-04-15

## 2022-04-15 ENCOUNTER — APPOINTMENT (OUTPATIENT)
Dept: CARDIOLOGY | Facility: HOSPITAL | Age: 72
End: 2022-04-15

## 2022-04-15 VITALS — DIASTOLIC BLOOD PRESSURE: 69 MMHG | OXYGEN SATURATION: 100 % | SYSTOLIC BLOOD PRESSURE: 125 MMHG

## 2022-04-15 DIAGNOSIS — F03.90 UNSPECIFIED DEMENTIA, UNSPECIFIED SEVERITY, WITHOUT BEHAVIOR: ICD-10-CM

## 2022-04-15 DIAGNOSIS — K29.60 OTHER GASTRITIS WITHOUT BLEEDING: ICD-10-CM

## 2022-04-15 DIAGNOSIS — R63.39 OTHER FEEDING DIFFICULTIES: ICD-10-CM

## 2022-04-15 DIAGNOSIS — I63.9 CEREBRAL INFARCTION, UNSPECIFIED: ICD-10-CM

## 2022-04-15 DIAGNOSIS — I10 ESSENTIAL (PRIMARY) HYPERTENSION: ICD-10-CM

## 2022-04-15 DIAGNOSIS — R13.10 DYSPHAGIA, UNSPECIFIED: ICD-10-CM

## 2022-04-15 PROBLEM — R63.30 FEEDING DIFFICULTY: Status: ACTIVE | Noted: 2022-04-12

## 2022-04-15 LAB
ANION GAP SERPL CALCULATED.3IONS-SCNC: 13 MMOL/L (ref 5–15)
BASOPHILS # BLD AUTO: 0.1 10*3/MM3 (ref 0–0.2)
BASOPHILS NFR BLD AUTO: 1.1 % (ref 0–1.5)
BUN SERPL-MCNC: 17 MG/DL (ref 8–23)
BUN/CREAT SERPL: 21.3 (ref 7–25)
CALCIUM SPEC-SCNC: 9.5 MG/DL (ref 8.6–10.5)
CHLORIDE SERPL-SCNC: 103 MMOL/L (ref 98–107)
CO2 SERPL-SCNC: 23 MMOL/L (ref 22–29)
CREAT SERPL-MCNC: 0.8 MG/DL (ref 0.76–1.27)
DEPRECATED RDW RBC AUTO: 39.8 FL (ref 37–54)
EGFRCR SERPLBLD CKD-EPI 2021: 94 ML/MIN/1.73
EOSINOPHIL # BLD AUTO: 0.1 10*3/MM3 (ref 0–0.4)
EOSINOPHIL NFR BLD AUTO: 0.7 % (ref 0.3–6.2)
ERYTHROCYTE [DISTWIDTH] IN BLOOD BY AUTOMATED COUNT: 13.1 % (ref 12.3–15.4)
GLUCOSE SERPL-MCNC: 79 MG/DL (ref 65–99)
HCT VFR BLD AUTO: 37.5 % (ref 37.5–51)
HGB BLD-MCNC: 13.3 G/DL (ref 13–17.7)
HOLD SPECIMEN: NORMAL
INR PPP: 1.05 (ref 0.93–1.1)
LYMPHOCYTES # BLD AUTO: 0.7 10*3/MM3 (ref 0.7–3.1)
LYMPHOCYTES NFR BLD AUTO: 9.3 % (ref 19.6–45.3)
MAGNESIUM SERPL-MCNC: 1.8 MG/DL (ref 1.6–2.4)
MAXIMAL PREDICTED HEART RATE: 148 BPM
MCH RBC QN AUTO: 30.2 PG (ref 26.6–33)
MCHC RBC AUTO-ENTMCNC: 35.3 G/DL (ref 31.5–35.7)
MCV RBC AUTO: 85.5 FL (ref 79–97)
MONOCYTES # BLD AUTO: 0.5 10*3/MM3 (ref 0.1–0.9)
MONOCYTES NFR BLD AUTO: 7.4 % (ref 5–12)
NEUTROPHILS NFR BLD AUTO: 5.7 10*3/MM3 (ref 1.7–7)
NEUTROPHILS NFR BLD AUTO: 81.5 % (ref 42.7–76)
NRBC BLD AUTO-RTO: 0.1 /100 WBC (ref 0–0.2)
PLATELET # BLD AUTO: 245 10*3/MM3 (ref 140–450)
PMV BLD AUTO: 8.5 FL (ref 6–12)
POTASSIUM SERPL-SCNC: 3.7 MMOL/L (ref 3.5–5.2)
PROTHROMBIN TIME: 10.8 SECONDS (ref 9.6–11.7)
RBC # BLD AUTO: 4.39 10*6/MM3 (ref 4.14–5.8)
SODIUM SERPL-SCNC: 139 MMOL/L (ref 136–145)
STRESS TARGET HR: 126 BPM
WBC NRBC COR # BLD: 7 10*3/MM3 (ref 3.4–10.8)

## 2022-04-15 PROCEDURE — 83735 ASSAY OF MAGNESIUM: CPT | Performed by: HOSPITALIST

## 2022-04-15 PROCEDURE — 99232 SBSQ HOSP IP/OBS MODERATE 35: CPT | Performed by: HOSPITALIST

## 2022-04-15 PROCEDURE — 25010000002 PROPOFOL 10 MG/ML EMULSION: Performed by: ANESTHESIOLOGY

## 2022-04-15 PROCEDURE — 88342 IMHCHEM/IMCYTCHM 1ST ANTB: CPT | Performed by: INTERNAL MEDICINE

## 2022-04-15 PROCEDURE — 25010000002 PROPOFOL 200 MG/20ML EMULSION: Performed by: ANESTHESIOLOGY

## 2022-04-15 PROCEDURE — 85610 PROTHROMBIN TIME: CPT | Performed by: NURSE PRACTITIONER

## 2022-04-15 PROCEDURE — 99232 SBSQ HOSP IP/OBS MODERATE 35: CPT | Performed by: INTERNAL MEDICINE

## 2022-04-15 PROCEDURE — 97530 THERAPEUTIC ACTIVITIES: CPT

## 2022-04-15 PROCEDURE — 88305 TISSUE EXAM BY PATHOLOGIST: CPT | Performed by: INTERNAL MEDICINE

## 2022-04-15 PROCEDURE — 93312 ECHO TRANSESOPHAGEAL: CPT

## 2022-04-15 PROCEDURE — 97116 GAIT TRAINING THERAPY: CPT

## 2022-04-15 PROCEDURE — 97112 NEUROMUSCULAR REEDUCATION: CPT

## 2022-04-15 PROCEDURE — B246ZZ4 ULTRASONOGRAPHY OF RIGHT AND LEFT HEART, TRANSESOPHAGEAL: ICD-10-PCS | Performed by: INTERNAL MEDICINE

## 2022-04-15 PROCEDURE — 93325 DOPPLER ECHO COLOR FLOW MAPG: CPT | Performed by: INTERNAL MEDICINE

## 2022-04-15 PROCEDURE — 0DB68ZX EXCISION OF STOMACH, VIA NATURAL OR ARTIFICIAL OPENING ENDOSCOPIC, DIAGNOSTIC: ICD-10-PCS | Performed by: INTERNAL MEDICINE

## 2022-04-15 PROCEDURE — 93325 DOPPLER ECHO COLOR FLOW MAPG: CPT

## 2022-04-15 PROCEDURE — 80048 BASIC METABOLIC PNL TOTAL CA: CPT | Performed by: HOSPITALIST

## 2022-04-15 PROCEDURE — 85025 COMPLETE CBC W/AUTO DIFF WBC: CPT | Performed by: HOSPITALIST

## 2022-04-15 PROCEDURE — 93312 ECHO TRANSESOPHAGEAL: CPT | Performed by: INTERNAL MEDICINE

## 2022-04-15 PROCEDURE — 43246 EGD PLACE GASTROSTOMY TUBE: CPT | Performed by: INTERNAL MEDICINE

## 2022-04-15 PROCEDURE — C1769 GUIDE WIRE: HCPCS | Performed by: INTERNAL MEDICINE

## 2022-04-15 PROCEDURE — 93320 DOPPLER ECHO COMPLETE: CPT

## 2022-04-15 RX ORDER — NALOXONE HCL 0.4 MG/ML
0.4 VIAL (ML) INJECTION AS NEEDED
Status: DISCONTINUED | OUTPATIENT
Start: 2022-04-15 | End: 2022-04-15 | Stop reason: HOSPADM

## 2022-04-15 RX ORDER — HYDROCODONE BITARTRATE AND ACETAMINOPHEN 5; 325 MG/1; MG/1
1 TABLET ORAL ONCE AS NEEDED
Status: DISCONTINUED | OUTPATIENT
Start: 2022-04-15 | End: 2022-04-15 | Stop reason: HOSPADM

## 2022-04-15 RX ORDER — ACETAMINOPHEN 650 MG/1
650 SUPPOSITORY RECTAL ONCE AS NEEDED
Status: DISCONTINUED | OUTPATIENT
Start: 2022-04-15 | End: 2022-04-15 | Stop reason: HOSPADM

## 2022-04-15 RX ORDER — ACETAMINOPHEN 325 MG/1
650 TABLET ORAL ONCE AS NEEDED
Status: DISCONTINUED | OUTPATIENT
Start: 2022-04-15 | End: 2022-04-15 | Stop reason: HOSPADM

## 2022-04-15 RX ORDER — PROMETHAZINE HYDROCHLORIDE 25 MG/1
25 SUPPOSITORY RECTAL ONCE AS NEEDED
Status: DISCONTINUED | OUTPATIENT
Start: 2022-04-15 | End: 2022-04-15 | Stop reason: HOSPADM

## 2022-04-15 RX ORDER — KETOROLAC TROMETHAMINE 30 MG/ML
15 INJECTION, SOLUTION INTRAMUSCULAR; INTRAVENOUS EVERY 6 HOURS PRN
Status: DISCONTINUED | OUTPATIENT
Start: 2022-04-15 | End: 2022-04-15 | Stop reason: HOSPADM

## 2022-04-15 RX ORDER — ASPIRIN 81 MG/1
324 TABLET, CHEWABLE ORAL DAILY
Status: DISCONTINUED | OUTPATIENT
Start: 2022-04-15 | End: 2022-04-17 | Stop reason: HOSPADM

## 2022-04-15 RX ORDER — METOCLOPRAMIDE HYDROCHLORIDE 5 MG/ML
5 INJECTION INTRAMUSCULAR; INTRAVENOUS 3 TIMES DAILY
Status: DISCONTINUED | OUTPATIENT
Start: 2022-04-16 | End: 2022-04-17 | Stop reason: HOSPADM

## 2022-04-15 RX ORDER — PANTOPRAZOLE SODIUM 40 MG/10ML
40 INJECTION, POWDER, LYOPHILIZED, FOR SOLUTION INTRAVENOUS
Status: DISCONTINUED | OUTPATIENT
Start: 2022-04-15 | End: 2022-04-17 | Stop reason: HOSPADM

## 2022-04-15 RX ORDER — CEFAZOLIN 2 G/1
INJECTION, POWDER, FOR SOLUTION INTRAMUSCULAR; INTRAVENOUS
Status: DISPENSED
Start: 2022-04-15 | End: 2022-04-16

## 2022-04-15 RX ORDER — ASPIRIN 300 MG/1
300 SUPPOSITORY RECTAL DAILY
Status: DISCONTINUED | OUTPATIENT
Start: 2022-04-15 | End: 2022-04-17 | Stop reason: HOSPADM

## 2022-04-15 RX ORDER — LORAZEPAM 2 MG/ML
0.5 INJECTION INTRAMUSCULAR
Status: DISCONTINUED | OUTPATIENT
Start: 2022-04-15 | End: 2022-04-15 | Stop reason: HOSPADM

## 2022-04-15 RX ORDER — SODIUM CHLORIDE 0.9 % (FLUSH) 0.9 %
3 SYRINGE (ML) INJECTION EVERY 12 HOURS SCHEDULED
Status: DISCONTINUED | OUTPATIENT
Start: 2022-04-15 | End: 2022-04-17 | Stop reason: HOSPADM

## 2022-04-15 RX ORDER — SODIUM CHLORIDE 0.9 % (FLUSH) 0.9 %
3-10 SYRINGE (ML) INJECTION AS NEEDED
Status: DISCONTINUED | OUTPATIENT
Start: 2022-04-15 | End: 2022-04-17 | Stop reason: HOSPADM

## 2022-04-15 RX ORDER — PROPOFOL 10 MG/ML
INJECTION, EMULSION INTRAVENOUS AS NEEDED
Status: DISCONTINUED | OUTPATIENT
Start: 2022-04-15 | End: 2022-04-15 | Stop reason: SURG

## 2022-04-15 RX ORDER — FLUMAZENIL 0.1 MG/ML
0.5 INJECTION INTRAVENOUS AS NEEDED
Status: DISCONTINUED | OUTPATIENT
Start: 2022-04-15 | End: 2022-04-15 | Stop reason: HOSPADM

## 2022-04-15 RX ORDER — PROPOFOL 10 MG/ML
VIAL (ML) INTRAVENOUS AS NEEDED
Status: DISCONTINUED | OUTPATIENT
Start: 2022-04-15 | End: 2022-04-15 | Stop reason: SURG

## 2022-04-15 RX ORDER — MORPHINE SULFATE 4 MG/ML
2 INJECTION, SOLUTION INTRAMUSCULAR; INTRAVENOUS
Status: DISCONTINUED | OUTPATIENT
Start: 2022-04-15 | End: 2022-04-15 | Stop reason: HOSPADM

## 2022-04-15 RX ORDER — MEPERIDINE HYDROCHLORIDE 25 MG/ML
12.5 INJECTION INTRAMUSCULAR; INTRAVENOUS; SUBCUTANEOUS
Status: DISCONTINUED | OUTPATIENT
Start: 2022-04-15 | End: 2022-04-15 | Stop reason: HOSPADM

## 2022-04-15 RX ORDER — PROMETHAZINE HYDROCHLORIDE 25 MG/1
25 TABLET ORAL ONCE AS NEEDED
Status: DISCONTINUED | OUTPATIENT
Start: 2022-04-15 | End: 2022-04-15 | Stop reason: HOSPADM

## 2022-04-15 RX ORDER — ONDANSETRON 2 MG/ML
4 INJECTION INTRAMUSCULAR; INTRAVENOUS ONCE AS NEEDED
Status: DISCONTINUED | OUTPATIENT
Start: 2022-04-15 | End: 2022-04-15 | Stop reason: HOSPADM

## 2022-04-15 RX ADMIN — PANTOPRAZOLE SODIUM 40 MG: 40 INJECTION, POWDER, FOR SOLUTION INTRAVENOUS at 17:52

## 2022-04-15 RX ADMIN — PROPOFOL 20 MG: 10 INJECTION, EMULSION INTRAVENOUS at 13:09

## 2022-04-15 RX ADMIN — METOPROLOL TARTRATE 5 MG: 5 INJECTION, SOLUTION INTRAVENOUS at 17:52

## 2022-04-15 RX ADMIN — PROPOFOL 20 MG: 10 INJECTION, EMULSION INTRAVENOUS at 13:07

## 2022-04-15 RX ADMIN — Medication 3 ML: at 20:35

## 2022-04-15 RX ADMIN — PROPOFOL 80 MG: 10 INJECTION, EMULSION INTRAVENOUS at 14:36

## 2022-04-15 RX ADMIN — METOPROLOL TARTRATE 5 MG: 5 INJECTION, SOLUTION INTRAVENOUS at 00:49

## 2022-04-15 RX ADMIN — PROPOFOL 60 MG: 10 INJECTION, EMULSION INTRAVENOUS at 13:05

## 2022-04-15 RX ADMIN — PROPOFOL 40 MG: 10 INJECTION, EMULSION INTRAVENOUS at 14:39

## 2022-04-15 RX ADMIN — PROPOFOL 40 MG: 10 INJECTION, EMULSION INTRAVENOUS at 14:44

## 2022-04-15 RX ADMIN — CEFAZOLIN SODIUM 2 G: 1 INJECTION, POWDER, FOR SOLUTION INTRAMUSCULAR; INTRAVENOUS at 14:40

## 2022-04-15 RX ADMIN — PROPOFOL 40 MG: 10 INJECTION, EMULSION INTRAVENOUS at 14:41

## 2022-04-15 NOTE — ANESTHESIA POSTPROCEDURE EVALUATION
Patient: Goran Dunbar    Procedure Summary     Date: 04/15/22 Room / Location: [facility]    Anesthesia Start: 1302 Anesthesia Stop: 1314    Procedure: ADULT TRANSESOPHAGEAL ECHO (BRIAN) W/ CONT IF NECESSARY PER PROTOCOL Diagnosis: (Cardiac Source of Emboli)    Scheduled Providers:  Provider: Grady Layton MD    Anesthesia Type: MAC ASA Status: 3          Anesthesia Type: MAC    Vitals  Vitals Value Taken Time   /77 04/15/22 1332   Temp     Pulse 75 04/15/22 1340   Resp 15 04/15/22 1311   SpO2 98 % 04/15/22 1329   Vitals shown include unvalidated device data.        Post Anesthesia Care and Evaluation    Patient location during evaluation: PACU  Patient participation: complete - patient participated  Level of consciousness: awake  Pain scale: See nurse's notes for pain score.  Pain management: adequate  Airway patency: patent  Anesthetic complications: No anesthetic complications  PONV Status: none  Cardiovascular status: acceptable  Respiratory status: acceptable  Hydration status: acceptable    Comments: Patient seen and examined postoperatively; vital signs stable; SpO2 greater than or equal to 90%; cardiopulmonary status stable; nausea/vomiting adequately controlled; pain adequately controlled; no apparent anesthesia complications; patient discharged from anesthesia care when discharge criteria were met

## 2022-04-15 NOTE — PLAN OF CARE
Goal Outcome Evaluation:   Patient cooperative and pleasant today. A/O x 1. NIH 6. Patient family expressed they would like to look into [facility] for discharge plans. Remains NPO. VSS. Chair alarm activated and call light within reach.    Problem: Adult Inpatient Plan of Care  Goal: Plan of Care Review  Outcome: Ongoing, Progressing  Goal: Patient-Specific Goal (Individualized)  Outcome: Ongoing, Progressing  Goal: Absence of Hospital-Acquired Illness or Injury  Outcome: Ongoing, Progressing  Intervention: Identify and Manage Fall Risk  Recent Flowsheet Documentation  Taken 4/15/2022 0200 by Jocelyn Muniz, RN  Safety Promotion/Fall Prevention:   safety round/check completed   activity supervised   assistive device/personal items within reach   clutter free environment maintained   fall prevention program maintained   nonskid shoes/slippers when out of bed   room organization consistent  Taken 4/15/2022 0000 by Jocelyn Muniz, RN  Safety Promotion/Fall Prevention:   safety round/check completed   activity supervised   assistive device/personal items within reach   clutter free environment maintained   fall prevention program maintained   nonskid shoes/slippers when out of bed   room organization consistent  Taken 4/14/2022 2158 by Jocelyn Muniz, RN  Safety Promotion/Fall Prevention:   safety round/check completed   activity supervised   assistive device/personal items within reach   clutter free environment maintained   fall prevention program maintained   nonskid shoes/slippers when out of bed   room organization consistent  Taken 4/14/2022 2000 by Jocelyn Muniz, RN  Safety Promotion/Fall Prevention:   safety round/check completed   activity supervised   assistive device/personal items within reach   clutter free environment maintained   fall prevention program maintained   nonskid shoes/slippers when out of bed   room organization consistent  Intervention: Prevent and Manage VTE (Venous  Thromboembolism) Risk  Recent Flowsheet Documentation  Taken 4/14/2022 2000 by Jocelyn Muniz RN  VTE Prevention/Management: (pt in chair)   bilateral   compression stockings off   other (see comments)  Intervention: Prevent Infection  Recent Flowsheet Documentation  Taken 4/15/2022 0200 by Jocelyn Muniz RN  Infection Prevention:   environmental surveillance performed   hand hygiene promoted  Taken 4/15/2022 0000 by Jocelyn Muniz RN  Infection Prevention: environmental surveillance performed  Taken 4/14/2022 2158 by Jocelyn Muniz RN  Infection Prevention:   environmental surveillance performed   hand hygiene promoted  Taken 4/14/2022 2000 by Jocelyn Muniz RN  Infection Prevention:   environmental surveillance performed   hand hygiene promoted  Goal: Optimal Comfort and Wellbeing  Outcome: Ongoing, Progressing  Intervention: Provide Person-Centered Care  Recent Flowsheet Documentation  Taken 4/15/2022 0000 by Jocelyn Muniz RN  Trust Relationship/Rapport: care explained  Taken 4/14/2022 2000 by Jocelyn Muniz RN  Trust Relationship/Rapport:   choices provided   care explained  Goal: Readiness for Transition of Care  Outcome: Ongoing, Progressing     Problem: Fall Injury Risk  Goal: Absence of Fall and Fall-Related Injury  Outcome: Ongoing, Progressing  Intervention: Identify and Manage Contributors  Recent Flowsheet Documentation  Taken 4/15/2022 0200 by Jocelyn Muniz RN  Medication Review/Management: medications reviewed  Taken 4/15/2022 0000 by Jocelyn Muniz RN  Medication Review/Management: medications reviewed  Taken 4/14/2022 2158 by Jocelyn Muniz RN  Medication Review/Management: medications reviewed  Taken 4/14/2022 2000 by Jocelyn Muniz RN  Medication Review/Management: medications reviewed  Intervention: Promote Injury-Free Environment  Recent Flowsheet Documentation  Taken 4/15/2022 0200 by Jocelyn Muniz RN  Safety Promotion/Fall Prevention:   safety round/check  completed   activity supervised   assistive device/personal items within reach   clutter free environment maintained   fall prevention program maintained   nonskid shoes/slippers when out of bed   room organization consistent  Taken 4/15/2022 0000 by Jocelyn Muniz, RN  Safety Promotion/Fall Prevention:   safety round/check completed   activity supervised   assistive device/personal items within reach   clutter free environment maintained   fall prevention program maintained   nonskid shoes/slippers when out of bed   room organization consistent  Taken 4/14/2022 2158 by Jocelyn Muniz, RN  Safety Promotion/Fall Prevention:   safety round/check completed   activity supervised   assistive device/personal items within reach   clutter free environment maintained   fall prevention program maintained   nonskid shoes/slippers when out of bed   room organization consistent  Taken 4/14/2022 2000 by Jocelyn Muniz, RN  Safety Promotion/Fall Prevention:   safety round/check completed   activity supervised   assistive device/personal items within reach   clutter free environment maintained   fall prevention program maintained   nonskid shoes/slippers when out of bed   room organization consistent     Problem: Skin Injury Risk Increased  Goal: Skin Health and Integrity  Outcome: Ongoing, Progressing  Intervention: Optimize Skin Protection  Recent Flowsheet Documentation  Taken 4/14/2022 2000 by Jocelyn Muniz, RN  Pressure Reduction Techniques: frequent weight shift encouraged  Pressure Reduction Devices: pressure-redistributing mattress utilized

## 2022-04-15 NOTE — DISCHARGE INSTRUCTIONS
A responsible adult should stay with you and you should rest quietly for the rest of the day.    Do not drink alcohol, drive operate any heavy machinery or power tools or make any legal/important decisions for the next 24 hours.    Progress your diet as tolerated.  If you begin to experience severe pain, increased shortness of breath, racing heartbeat or a fever above 101F, seek immediate medical attention.     Follow up with MD as instructed.  Call office for results in 3 to 5 days if needed.        Description of Procedure:  A description of the procedure as well as risks, benefits and alternative methods were explained to the patient and or power of  who voiced understanding and signed the corresponding consent form. A physical exam was performed and vital signs were monitored throughout the procedure.     An upper GI endoscope was placed into the mouth and proceeded through the esophagus, stomach and second portion of the duodenum without difficulty. The scope was then retroflexed and the fundus was visualized. A 20 [place] PEG was placed in the stomach body using standard Push technique. The procedure was not difficult and there were no immediate complications.     Findings:   Focal changes of gastritis was seen in the fundus area with some thickening of mucosa that site biopsy was performed.  After selecting appropriate spot with a blue band transillumination, area was cleaned with a Betadine, incision was made, trocar cannula was introduced under direct realization wire was placed and grabbed with a snare.  PEG tube was placed with the Ponsky technique     Impression:  1.  Successful PEG tube placement.  2.  Focal gastritis involving the fundus of the stomach biopsy was performed.  3.  Suspect atrophic gastritis     Recommendations:  Keep n.p.o. for now.  Continue with the PPI.  Patient will be placed on a very low-dose of Reglan for 48 hours.  Start tube feeding in the morning.        Hernesto Nicholas MD       Date: 4/15/2022    Time: 14:50 EDT

## 2022-04-15 NOTE — CASE MANAGEMENT/SOCIAL WORK
Continued Stay Note   Paul     Patient Name: Goran Dunbar  MRN: [id number]  Today's Date: 4/15/2022    Admit Date: 4/12/2022     Discharge Plan     Row Name 04/15/22 0815       Plan    Plan Awaiting IP rehab choices from family (CM left voicemails). Lives w/daughter (Eva)    Plan Comments CM placed call to both daughters (Gisele & Maribel) who stated they will be the decision makers. Left voice mails for both daughters requesting three preferences early this morning in order for referrals to be sent. Awaiting return call.                    Expected Discharge Date and Time     Expected Discharge Date Expected Discharge Time    Apr 18, 2022         Phone communication or documentation only - no physical contact with patient or family.    Jacki Valentine, RN, MSN  Care Manager  [phone]

## 2022-04-15 NOTE — CONSULTS
"Nutrition Services    Patient Name: Goran Dunbar  YOB: 1950  MRN: [id number]  Admission date: 4/12/2022    Comment:    EN Prescription: Isosource 1.5 at 30mL/hr + 10mL/hr water flush     PPE Documentation        PPE Worn By Provider N/A, did not enter pt's room this date   PPE Worn By Patient  N/A     CLINICAL NUTRITION ASSESSMENT      Reason for Assessment 4/15: NPO x3 days, TF consult   H&P      Past Medical History:   Diagnosis Date   • Aphasia 4/12/2022   • Dementia with behavioral disturbance (HCC) 4/12/2022   • Essential hypertension 4/12/2022   • Facial droop 4/12/2022   • Suspected cerebrovascular accident (CVA) 4/12/2022       No past surgical history on file.     Current Problems   Slurred speech  -suspected CVA    Dysphagia  -failed VFSS 4/13  -GI consulted for PEG placement 4/15    Vit B12 def  -on replacement    Dementia    HTN     Encounter Information        Trending Narrative     4/15: Pt not in room at time of visit- down for PEG placement. Chart reviewed, work up for TF. Noted patient failed ST eval s/p CVA.     Anthropometrics        Current Height, Weight Height: 177.8 cm (70\")  Weight: 71.9 kg (158 lb 8.2 oz) (04/15/22 1114)       Ideal Body Weight (IBW) 166lb   Usual Body Weight (UBW) BELINDA       Trending Weight Hx     This admission: 4/15: current weight up 16lb since admit             PTA:     Wt Readings from Last 30 Encounters:   04/15/22 1114 71.9 kg (158 lb 8.2 oz)   04/15/22 0527 71.9 kg (158 lb 8.2 oz)   04/14/22 0625 70 kg (154 lb 5.2 oz)   04/14/22 0300 69.5 kg (153 lb 3.5 oz)   04/13/22 1649 64.4 kg (142 lb)   04/13/22 0600 64.7 kg (142 lb 10.2 oz)   04/12/22 2331 64.7 kg (142 lb 10.2 oz)      BMI kg/m2 Body mass index is 22.74 kg/m².       Labs        Pertinent Labs    Results from last 7 days   Lab Units 04/15/22  0310 04/13/22  0048 04/12/22  1745   SODIUM mmol/L 139 141 139   POTASSIUM mmol/L 3.7 3.6 3.9   CHLORIDE mmol/L 103 104 102   CO2 mmol/L 23.0 25.0 " "25.0   BUN mg/dL 17 11 12   CREATININE mg/dL 0.80 0.86 0.96   CALCIUM mg/dL 9.5 9.0 9.2   BILIRUBIN mg/dL  --  0.9 1.0   ALK PHOS U/L  --  104 116   ALT (SGPT) U/L  --  14 11   AST (SGOT) U/L  --  21 20   GLUCOSE mg/dL 79 93 112*     Results from last 7 days   Lab Units 04/15/22  0310 04/13/22  0048 04/12/22  1745   MAGNESIUM mg/dL 1.8  --   --    HEMOGLOBIN g/dL 13.3   < > 13.7   HEMATOCRIT % 37.5   < > 38.5   TRIGLYCERIDES mg/dL  --   --  99    < > = values in this interval not displayed.     COVID19   Date Value Ref Range Status   04/12/2022 Not Detected Not Detected - Ref. Range Final     Lab Results   Component Value Date    HGBA1C 5.6 04/12/2022        Medications    Scheduled Medications atorvastatin, 80 mg, Oral, Nightly  Barium Sulfate, 1 teaspoon(s), Oral, Once in imaging  cyanocobalamin, 1,000 mcg, Intramuscular, Q28 Days  folic acid, 1 mg, Oral, Daily  memantine, 5 mg, Oral, Nightly  metoprolol tartrate, 5 mg, Intravenous, Q6H  pantoprazole, 40 mg, Intravenous, BID AC  polyethylene glycol, 17 g, Oral, Daily  senna-docusate sodium, 2 tablet, Oral, BID        Infusions sodium chloride, 75 mL/hr, Last Rate: 75 mL/hr (04/13/22 1207)        PRN Medications niCARdipine  •  risperiDONE  •  sodium chloride     Physical Findings        Trending Physical   Appearance, NFPE 4/15: unable to assess in person this date   --  Edema  none     Bowel Function No BM since admit     Tubes No current access, PEG to be placed 4/15     Chewing/Swallowing Failed VFSS     Skin No pressure injuries     Estimated/Assessed Needs       Energy Requirements    Height for Calculation  Height: 177.8 cm (70\")   Weight for Calculation 71.9kg, CBW   Method for Estimation  25-30kcal/kg   EST Needs (kcal/day) 1798-2157kcal       Protein Requirements    Weight for Calculation 71.9kg   EST Protein Needs (g/kg) 1.2-1.5g/kg   EST Daily Needs (g/day) 86-108g/day       Fluid Requirements     Estimated Needs (mL/day) 1mL/kcal       Fluid Deficit  "   Current Na Level (mEq/L)    Desired Na Level (mEq/L)    Estimated Fluid Deficit (L)       Current Nutrition Orders & Evaluation of Intake       Oral Nutrition     Food Allergies NKFA   Current PO Diet NPO Diet NPO Type: Strict NPO   Supplement    PO Evaluation     Trending % PO Intake NPO     Enteral Nutrition    Enteral Route    TF Modular    TF Delivery Method    Current TF Order    Current Water Flush     TF Residual/Tolerance     TF Observation         Parenteral Nutrition     TPN Route    Current TPN Order    Lipids (mL/%/frequency)     MVI Frequency     Trace Element Frequency     Total # Days on TPN    TPN Observation         Nutrition Course Details    PO Diets: NPO since admit   Nutrition Support: TF to start s/p PEG 4/15     Nutritional Risk Screening        NRS-2002 Score          Nutrition Diagnosis         Nutrition Dx Problem 1 Swallowing difficulty related to CVA as evidenced by NPO with need for PEG.      Nutrition Dx Problem 2        Intervention Goal         Intervention Goal(s) TF initiated and tolerated     Nutrition Intervention        RD Action Workup and order EN     Nutrition Prescription          Diet Prescription NPO   Supplement Prescription      Enteral Prescription Initial Goal:  *initial goal conservative d/t risk of RFS     Isosource 1.5 at 30mL/hr + 10mL/hr water flush      End Goal:    Isosource 1.5 at 60 mL/hr + water flush pending clinical picture     Calories  1980 kcals (in range)    Protein  90 g (in range)    Free water  1003 mL    Flushes  Pending clinical picture     The above end goal rate is for 22 hrs/day to assume interruptions for ADLs. Water flushes adjusted based on clinical picture + Rx flushes/IV fluids        TPN Prescription      Monitor/Evaluation        Monitor I&O, Pertinent labs, EN delivery/tolerance, Weight, Skin status, GI status, Swallow function         Electronically signed by:  Tiffany Bautista RD  04/15/22 11:51 EDT

## 2022-04-15 NOTE — DISCHARGE PLACEMENT REQUEST
"George Rodrigez (72 y.o. Male)             Date of Birth   1950    Social Security Number       Address   [address] [place] [address]    Home Phone   [phone]    MRN   [id number]       Confucianism   None    Marital Status   Single                            Admission Date   4/12/22    Admission Type   Emergency    Admitting Provider   Jewel Wyatt DO    Attending Provider   Jewel Wyatt DO    Department, Room/Bed   [facility] NEURO HEART, 252/1       Discharge Date       Discharge Disposition       Discharge Destination                               Attending Provider: Jewel Wyatt DO    Allergies: No Known Allergies    Isolation: None   Infection: None   Code Status: CPR   Advance Care Planning Activity    Ht: 177.8 cm (70\")   Wt: 71.9 kg (158 lb 8.2 oz)    Admission Cmt: None   Principal Problem: Suspected cerebrovascular accident (CVA) [R09.89]                 Active Insurance as of 4/12/2022     Primary Coverage     Payor Plan Insurance Group Employer/Plan Group    MEDICARE MEDICARE A & B      Payor Plan Address Payor Plan Phone Number Payor Plan Fax Number Effective Dates    [address] [phone]  1/1/2015 - None Entered    [facility] [id number]       Subscriber Name Subscriber Birth Date Member ID       GEORGE RODRIGEZ 1950 [id number]           Secondary Coverage     Payor Plan Insurance Group Employer/Plan Group    [place]      Payor Plan Address Payor Plan Phone Number Payor Plan Fax Number Effective Dates    [address]   4/12/2022 - None Entered    [place] [address]       Subscriber Name Subscriber Birth Date Member ID       GEORGE RODRIGEZ 1950 [id number]                 Emergency Contacts      (Rel.) Home Phone Work Phone Mobile Phone    renetta rodrigez (Daughter) -- -- [phone]    nichoiqra godoy (Daughter) -- -- [phone]              "

## 2022-04-15 NOTE — PLAN OF CARE
"Goal Outcome Evaluation:                   71 y/o M admitted suspected CVA. CT-, MRI+ several sm subacute areas infarcts w/in L temporal, L basal ganglia, R frontal lobes. BRIAN, EGD/PEG placed today. Pt off the unit most of the shift. Pt came back from [facility] A/1 -baseline. All procedures went well. To begin PEG tube feeds tomorrow. Dr. Jackman stated to stop Haldol, start Risperdal- though he did not place orders. Strict NPO tonight- per Dr. Nicholas. NIH-6 today-baseline. Pt from home w/ daughters. Report Paper mentions \"[facility]\"- not sure if this would be a LTC. BP HTNSIVE. All other vitals normal, Pt is very impulsive- does not use call light to get up- Hx of fall this stay- moved to 252 to be closer to nurse's station. Pt is continent of urine, no BM. Will cont to monitor.       Problem: Adult Inpatient Plan of Care  Goal: Plan of Care Review  Outcome: Ongoing, Progressing  Goal: Patient-Specific Goal (Individualized)  Outcome: Ongoing, Progressing  Goal: Absence of Hospital-Acquired Illness or Injury  Outcome: Ongoing, Progressing  Intervention: Identify and Manage Fall Risk  Recent Flowsheet Documentation  Taken 4/15/2022 1800 by Silvino Troy RN  Safety Promotion/Fall Prevention:   safety round/check completed   room organization consistent   nonskid shoes/slippers when out of bed   fall prevention program maintained   clutter free environment maintained   assistive device/personal items within reach  Taken 4/15/2022 1630 by Silvino Troy, RN  Safety Promotion/Fall Prevention:   safety round/check completed   room organization consistent   nonskid shoes/slippers when out of bed   lighting adjusted   fall prevention program maintained   clutter free environment maintained   activity supervised   assistive device/personal items within reach  Taken 4/15/2022 1405 by Silvino Troy, RN  Safety Promotion/Fall Prevention: patient off unit  Taken 4/15/2022 1000 by Silvino Troy, RN  Safety " Promotion/Fall Prevention:   room organization consistent   safety round/check completed   nonskid shoes/slippers when out of bed   fall prevention program maintained   clutter free environment maintained   assistive device/personal items within reach  Taken 4/15/2022 0800 by Silvino Troy RN  Safety Promotion/Fall Prevention:   safety round/check completed   room organization consistent   nonskid shoes/slippers when out of bed   lighting adjusted   fall prevention program maintained   clutter free environment maintained   activity supervised   assistive device/personal items within reach  Intervention: Prevent Skin Injury  Recent Flowsheet Documentation  Taken 4/15/2022 1630 by Silvino Troy RN  Skin Protection:   adhesive use limited   incontinence pads utilized   skin-to-device areas padded   skin-to-skin areas padded  Taken 4/15/2022 0800 by Silvino Troy RN  Skin Protection:   adhesive use limited   incontinence pads utilized   skin-to-device areas padded   tubing/devices free from skin contact  Intervention: Prevent and Manage VTE (Venous Thromboembolism) Risk  Recent Flowsheet Documentation  Taken 4/15/2022 1630 by Silvino Troy RN  VTE Prevention/Management: (Will place on)   bilateral   sequential compression devices off   other (see comments)  Intervention: Prevent Infection  Recent Flowsheet Documentation  Taken 4/15/2022 1800 by Silvino Troy RN  Infection Prevention:   hand hygiene promoted   personal protective equipment utilized   rest/sleep promoted  Taken 4/15/2022 1630 by Silvino Troy RN  Infection Prevention:   hand hygiene promoted   personal protective equipment utilized   rest/sleep promoted  Taken 4/15/2022 1000 by Silvino Troy RN  Infection Prevention:   hand hygiene promoted   personal protective equipment utilized   rest/sleep promoted  Taken 4/15/2022 0800 by Silvino Troy RN  Infection Prevention:   hand hygiene promoted   personal protective equipment  utilized   rest/sleep promoted  Goal: Optimal Comfort and Wellbeing  Outcome: Ongoing, Progressing  Intervention: Provide Person-Centered Care  Recent Flowsheet Documentation  Taken 4/15/2022 1630 by Silvino Troy RN  Trust Relationship/Rapport:   questions encouraged   thoughts/feelings acknowledged   reassurance provided   empathic listening provided   emotional support provided   choices provided   care explained   questions answered  Taken 4/15/2022 0800 by Silvino Troy RN  Trust Relationship/Rapport:   reassurance provided   thoughts/feelings acknowledged  Goal: Readiness for Transition of Care  Outcome: Ongoing, Progressing     Problem: Fall Injury Risk  Goal: Absence of Fall and Fall-Related Injury  Outcome: Ongoing, Progressing  Intervention: Identify and Manage Contributors  Recent Flowsheet Documentation  Taken 4/15/2022 1800 by Silvino Troy RN  Medication Review/Management: medications reviewed  Taken 4/15/2022 1630 by Silvino Troy RN  Medication Review/Management: medications reviewed  Taken 4/15/2022 1000 by Silvino Troy RN  Medication Review/Management: medications reviewed  Taken 4/15/2022 0800 by Silvino Troy RN  Medication Review/Management: medications reviewed  Intervention: Promote Injury-Free Environment  Recent Flowsheet Documentation  Taken 4/15/2022 1800 by Silvino Troy RN  Safety Promotion/Fall Prevention:   safety round/check completed   room organization consistent   nonskid shoes/slippers when out of bed   fall prevention program maintained   clutter free environment maintained   assistive device/personal items within reach  Taken 4/15/2022 1630 by Silvino Troy RN  Safety Promotion/Fall Prevention:   safety round/check completed   room organization consistent   nonskid shoes/slippers when out of bed   lighting adjusted   fall prevention program maintained   clutter free environment maintained   activity supervised   assistive device/personal items  within reach  Taken 4/15/2022 1405 by Silvino Troy RN  Safety Promotion/Fall Prevention: patient off unit  Taken 4/15/2022 1000 by Silvino Troy RN  Safety Promotion/Fall Prevention:   room organization consistent   safety round/check completed   nonskid shoes/slippers when out of bed   fall prevention program maintained   clutter free environment maintained   assistive device/personal items within reach  Taken 4/15/2022 0800 by Silvino Troy RN  Safety Promotion/Fall Prevention:   safety round/check completed   room organization consistent   nonskid shoes/slippers when out of bed   lighting adjusted   fall prevention program maintained   clutter free environment maintained   activity supervised   assistive device/personal items within reach     Problem: Skin Injury Risk Increased  Goal: Skin Health and Integrity  Outcome: Ongoing, Progressing  Intervention: Optimize Skin Protection  Recent Flowsheet Documentation  Taken 4/15/2022 1630 by Silvino Troy RN  Pressure Reduction Techniques:   frequent weight shift encouraged   pressure points protected   positioned off wounds   weight shift assistance provided  Pressure Reduction Devices:   positioning supports utilized   pressure-redistributing mattress utilized  Skin Protection:   adhesive use limited   incontinence pads utilized   skin-to-device areas padded   skin-to-skin areas padded  Taken 4/15/2022 0800 by Silvino Troy RN  Pressure Reduction Techniques:   frequent weight shift encouraged   positioned off wounds   pressure points protected   weight shift assistance provided  Pressure Reduction Devices:   positioning supports utilized   pressure-redistributing mattress utilized  Skin Protection:   adhesive use limited   incontinence pads utilized   skin-to-device areas padded   tubing/devices free from skin contact     Problem: Aspiration (Enteral Nutrition)  Goal: Absence of Aspiration Signs and Symptoms  Outcome: Ongoing, Progressing      Problem: Device-Related Complication Risk (Enteral Nutrition)  Goal: Safe, Effective Therapy Delivery  Outcome: Ongoing, Progressing     Problem: Feeding Intolerance (Enteral Nutrition)  Goal: Feeding Tolerance  Outcome: Ongoing, Progressing

## 2022-04-15 NOTE — PLAN OF CARE
BRIAN results per Dr. Vilchis:    No evidence for intracardiac thrombus is present.  Left atrial enlargement.  Left atrial appendage enlargement.  Normal left ventricular size and contractility with ejection fraction of 60%.  No evidence for PFO is present.    Event monitor x 30 days outpatient to rule out arrhythmias. If there are arrhythmias seen on event monitor, long term oral anticoagulation high recommended for stroke prevention. Patient needs to follow up outpatient with Neurology and PCP. Continue  PO or 300 LA. Plans for PEG placement today. Pt plans to be discharged to IP rehab. No further recommendation from Neurology. Please call with questions or concerns.

## 2022-04-15 NOTE — THERAPY TREATMENT NOTE
Subjective: Pt agreeable to therapeutic plan of care.  Pt is able to answer yes and no questions.  Pt is able to write appropriate questions and answers.     Objective:     Bed mobility - N/A or Not attempted. pt already out of the bed  Transfers - Min-A and with rolling walker sit to stand  Ambulation - 45 feet and 50 feet Min-A and with rolling walker  Moderately ataxic, cues to widen cain, pt veers to the right, pt unable to correct with verbal cues but with therapist moving walker back on path.      Pain: 0 VAS  Education: Provided education on importance of mobility and skilled verbal / tactile cueing throughout intervention.     Assessment: Goran Dunbar presents with functional mobility impairments which indicate the need for skilled intervention.  Pt is making progress with his mobility.  Pt still with very unsteady gait.  Tolerating session today without incident. Will continue to follow and progress as tolerated.     Plan/Recommendations:   Pt would benefit from Inpatient Rehabilitation placement at discharge from facility   Pt desires Inpatient Rehabilitation placement at discharge. Pt cooperative; agreeable to therapeutic recommendations and plan of care.     Basic Mobility 6-click:  Rollin = Total, A lot = 2, A little = 3; 4 = None  Supine>Sit:   1 = Total, A lot = 2, A little = 3; 4 = None   Sit>Stand with arms:  1 = Total, A lot = 2, A little = 3; 4 = None  Bed>Chair:   1 = Total, A lot = 2, A little = 3; 4 = None  Ambulate in room:  1 = Total, A lot = 2, A little = 3; 4 = None  3-5 Steps with railin = Total, A lot = 2, A little = 3; 4 = None  Score: 16    Modified [place]: 4 = Moderately severe disability (Unable to attend to own bodily needs without assistance, and unable to walk unassisted)     Post-Tx Position: Up in Chair, Alarms activated and Call light and personal items within reach  PPE: gloves, surgical mask, eyewear protection

## 2022-04-15 NOTE — PROGRESS NOTES
[facility] Medicine Services Daily Progress Note    Patient Name: Goran Dunbar  : 1950  MRN: [id number]  Primary Care Physician:  Suzanna Posada PA  Date of admission: 2022      Subjective      Chief Complaint: confusion      Patient Reports:    22: Patient confused.  History of dementia.  MRI with several tiny subacute infarcts within left temporal lobe, left basal ganglia and right frontal lobe thus cardiology consulted for BRIAN.  Failed video swallow eval.    22:  BRIAN tomorrow.  Patient confused.    4/15/22: s/p TTE and intracardiac thrombus and PFO ruled out.  Planned PEG tube today by GI patient with dysarthria but able to communicate with sign language. OOB to chair.  Awaiting rehab bed.    Review of Systems   Unable to perform ROS: mental status change          Objective      Vitals:   Temp:  [98 °F (36.7 °C)-98.4 °F (36.9 °C)] 98.4 °F (36.9 °C)  Heart Rate:  [68-85] 71  Resp:  [-22] 15  BP: (117-172)/(69-94) 127/80    Physical Exam  HENT:      Head: Normocephalic.      Nose: Nose normal.   Eyes:      General: No scleral icterus.     Extraocular Movements: Extraocular movements intact.      Pupils: Pupils are equal, round, and reactive to light.   Cardiovascular:      Rate and Rhythm: Normal rate and regular rhythm.   Pulmonary:      Effort: Pulmonary effort is normal.      Breath sounds: Normal breath sounds.   Abdominal:      General: Bowel sounds are normal.      Tenderness: There is no abdominal tenderness.   Musculoskeletal:      Cervical back: Normal range of motion.      Comments: Moves all extremities   Lymphadenopathy:      Cervical: No cervical adenopathy.   Skin:     General: Skin is warm.      Findings: No rash.   Neurological:      Mental Status: He is alert. He is confused.      Comments: Dysarthria.   Psychiatric:         Cognition and Memory: Memory is impaired.           Result Review    Result Review:  I have personally reviewed the  results from the time of this admission to 4/15/2022 13:28 EDT and agree with these findings:  [x]  Laboratory  []  Microbiology  [x]  Radiology  []  EKG/Telemetry   []  Cardiology/Vascular   []  Pathology  [x]  Old records  []  Other:      Wounds (last 24 hours)     LDA Wound     Row Name               Wound 04/13/22 0421 Bilateral medial coccyx Abrasion    Wound - Properties Group Placement Date: 04/13/22  -KH Placement Time: 0421 -KH Present on Hospital Admission: Y  -KH Side: Bilateral  -KH Orientation: medial  -KH Location: coccyx  -KH Primary Wound Type: Abrasion  -KH      Retired Wound - Properties Group Placement Date: 04/13/22  -KH Placement Time: 0421 -KH Present on Hospital Admission: Y  -KH Side: Bilateral  -KH Orientation: medial  -KH Location: coccyx  -KH Primary Wound Type: Abrasion  -KH      Retired Wound - Properties Group Date first assessed: 04/13/22  - Time first assessed: 0421 -KH Present on Hospital Admission: Y  -KH Side: Bilateral  -KH Location: coccyx  -KH Primary Wound Type: Abrasion  -KH            User Key  (r) = Recorded By, (t) = Taken By, (c) = Cosigned By    Initials Name Provider Type    Daija Rosario, RN Registered Nurse                  Assessment/Plan      Brief Patient Summary:  73-year-old male with dementia and admission for slurred speech due to ischemic CVA. BRIAN ruled out intracardiac thrombus and PFO.  Planned PEG tube due to severe dysphagia.  Patient awaiting rehab.      aspirin, 324 mg, Oral, Daily   Or  aspirin, 300 mg, Rectal, Daily  atorvastatin, 80 mg, Oral, Nightly  Barium Sulfate, 1 teaspoon(s), Oral, Once in imaging  cyanocobalamin, 1,000 mcg, Intramuscular, Q28 Days  folic acid, 1 mg, Oral, Daily  memantine, 5 mg, Oral, Nightly  metoprolol tartrate, 5 mg, Intravenous, Q6H  pantoprazole, 40 mg, Intravenous, BID AC  polyethylene glycol, 17 g, Oral, Daily  senna-docusate sodium, 2 tablet, Oral, BID       sodium chloride, 75 mL/hr, Last Rate: 75 mL/hr  (04/15/22 1302)         Active Hospital Problems:  Active Hospital Problems    Diagnosis    • **Suspected cerebrovascular accident (CVA)    • Aphasia    • Essential hypertension    • Dementia with behavioral disturbance (HCC)    • Facial droop    • Feeding difficulty      Added automatically from request for surgery [id number]         Slurred speech due to several tiny subacute infarcts within left temporal lobe, left basal ganglia and right frontal lobe:  -s/p CT head in ED   -s/p CTA head and neck 4/13/2022  -s/p MRI brain 4/13/2022  -Seen by neurology   -Concern for cardioembolic CVA thus cardiology consulted  -s/p BRIAN 4/15/2022--> intracardiac thrombus ruled out.  PFO ruled out.  -s/p video swallow eval 4/13/2022--> dysphagia.  -GI consulted--> plan for PEG tube 4/15/2022  -Cardiac monitor on discharge    Dysphagia:  -s/p failed video swallow eval 4/13/2022  -GI consulted--> plan for PEG tube 4/15/2022    Vitamin B12 deficiency:  -Continue to replete      Dementia with behavioral disturbance:  -Patient on Namenda  -We will add risperidone  -Patient lives with julio and has had dementia for several years    Hypertension:  -Recent please started on metoprolol and amlodipine by PCP      DVT prophylaxis:  Mechanical DVT prophylaxis orders are present.    CODE STATUS:    Code Status (Patient has no pulse and is not breathing): CPR (Attempt to Resuscitate)  Medical Interventions (Patient has pulse or is breathing): Full Support      Disposition:  I expect patient to be discharged defer.    This patient has been examined wearing appropriate Personal Protective Equipment and discussed with nursing. 04/15/22      Electronically signed by Jewel Wyatt DO, 04/15/22, 13:28 EDT.  [facility] Hospitalist Team

## 2022-04-15 NOTE — PROGRESS NOTES
Referring Provider: Jewel Wyatt,*    Reason for follow-up:  Recent stroke  Cardioembolic source.  Hypertension     Patient Care Team:  Suzanna Posada PA as PCP - General (Physician Assistant)    Subjective .  Doing okay     ROS    Since I have last seen, the patient has been without any chest discomfort ,shortness of breath, palpitations, dizziness or syncope.  Denies having any headache ,abdominal pain ,nausea, vomiting , diarrhea constipation, loss of weight or loss of appetite.  Denies having any excessive bruising ,hematuria or blood in the stool.    Review of all systems negative except as indicated    History  Past Medical History:   Diagnosis Date   • Aphasia 4/12/2022   • Dementia with behavioral disturbance (HCC) 4/12/2022   • Essential hypertension 4/12/2022   • Facial droop 4/12/2022   • Suspected cerebrovascular accident (CVA) 4/12/2022       No past surgical history on file.    Family History   Family history unknown: Yes       Social History     Tobacco Use   • Smoking status: Never Smoker   • Smokeless tobacco: Never Used   Substance Use Topics   • Alcohol use: Never   • Drug use: Never        Medications Prior to Admission   Medication Sig Dispense Refill Last Dose   • amLODIPine (NORVASC) 10 MG tablet Take 10 mg by mouth Daily.      • memantine (NAMENDA) 5 MG tablet Take 5 mg by mouth Daily.      • metoprolol succinate XL (TOPROL-XL) 50 MG 24 hr tablet Take 50 mg by mouth Daily.      • potassium chloride (K-DUR,KLOR-CON) 20 MEQ CR tablet Take 20 mEq by mouth Daily.          Allergies  Patient has no known allergies.    Scheduled Meds:aspirin, 325 mg, Oral, Daily   Or  aspirin, 300 mg, Rectal, Daily  atorvastatin, 80 mg, Oral, Nightly  Barium Sulfate, 1 teaspoon(s), Oral, Once in imaging  cyanocobalamin, 1,000 mcg, Intramuscular, Q28 Days  folic acid, 1 mg, Oral, Daily  memantine, 5 mg, Oral, Nightly  metoprolol tartrate, 5 mg, Intravenous, Q6H  polyethylene glycol, 17 g, Oral,  "Daily  senna-docusate sodium, 2 tablet, Oral, BID      Continuous Infusions:sodium chloride, 75 mL/hr, Last Rate: 75 mL/hr (04/13/22 1207)      PRN Meds:.niCARdipine  •  risperiDONE  •  sodium chloride    Objective     VITAL SIGNS  Vitals:    04/14/22 2205 04/15/22 0049 04/15/22 0151 04/15/22 0527   BP: 138/77 145/84 117/92 124/79   BP Location: Left arm  Left arm Right arm   Patient Position: Sitting  Sitting Lying   Pulse: 84  83 83   Resp: 11  15 14   Temp: 98.4 °F (36.9 °C)  98.1 °F (36.7 °C) 98.2 °F (36.8 °C)   TempSrc: Oral  Oral Oral   SpO2:       Weight:    71.9 kg (158 lb 8.2 oz)   Height:           Flowsheet Rows    Flowsheet Row First Filed Value   Admission Height 177.8 cm (70\") Documented at 04/12/2022 1723   Admission Weight 64.7 kg (142 lb 10.2 oz) Documented at 04/12/2022 2331            Intake/Output Summary (Last 24 hours) at 4/15/2022 0638  Last data filed at 4/15/2022 0400  Gross per 24 hour   Intake --   Output 550 ml   Net -550 ml        TELEMETRY: Sinus rhythm    Physical Exam:  The patient is alert, oriented and in no distress.  Vital signs as noted above.  Head and neck revealed no carotid bruits or jugular venous distention.  No thyromegaly or lymphadenopathy is present  Lungs clear.  No wheezing.  Breath sounds are normal bilaterally.  Heart normal first and second heart sounds.  No murmur. No precordial rub is present.  No gallop is present.  Abdomen soft and nontender.  No organomegaly is present.  Extremities with good peripheral pulses without any pedal edema.  Skin warm and dry.  Musculoskeletal system is grossly normal  CNS-dysarthria    Results Review:   I reviewed the patient's new clinical results.  Lab Results (last 24 hours)     Procedure Component Value Units Date/Time    Basic Metabolic Panel [[id number]]  (Normal) Collected: 04/15/22 0310    Specimen: Blood Updated: 04/15/22 0411     Glucose 79 mg/dL      BUN 17 mg/dL      Creatinine 0.80 mg/dL      Sodium 139 mmol/L      " Potassium 3.7 mmol/L      Chloride 103 mmol/L      CO2 23.0 mmol/L      Calcium 9.5 mg/dL      BUN/Creatinine Ratio 21.3     Anion Gap 13.0 mmol/L      eGFR 94.0 mL/min/1.73      Comment: National Kidney Foundation and American Society of Nephrology (ASN) Task Force recommended calculation based on the Chronic Kidney Disease Epidemiology Collaboration (CKD-EPI) equation refit without adjustment for race.       Narrative:      GFR Normal >60  Chronic Kidney Disease <60  Kidney Failure <15      Magnesium [[id number]]  (Normal) Collected: 04/15/22 0310    Specimen: Blood Updated: 04/15/22 0404     Magnesium 1.8 mg/dL     CBC & Differential [[id number]]  (Abnormal) Collected: 04/15/22 0310    Specimen: Blood Updated: 04/15/22 0342    Narrative:      The following orders were created for panel order CBC & Differential.  Procedure                               Abnormality         Status                     ---------                               -----------         ------                     CBC Auto Differential[[id number]]        Abnormal            Final result                 Please view results for these tests on the individual orders.    CBC Auto Differential [[id number]]  (Abnormal) Collected: 04/15/22 0310    Specimen: Blood Updated: 04/15/22 0342     WBC 7.00 10*3/mm3      RBC 4.39 10*6/mm3      Hemoglobin 13.3 g/dL      Hematocrit 37.5 %      MCV 85.5 fL      MCH 30.2 pg      MCHC 35.3 g/dL      RDW 13.1 %      RDW-SD 39.8 fl      MPV 8.5 fL      Platelets 245 10*3/mm3      Neutrophil % 81.5 %      Lymphocyte % 9.3 %      Monocyte % 7.4 %      Eosinophil % 0.7 %      Basophil % 1.1 %      Neutrophils, Absolute 5.70 10*3/mm3      Lymphocytes, Absolute 0.70 10*3/mm3      Monocytes, Absolute 0.50 10*3/mm3      Eosinophils, Absolute 0.10 10*3/mm3      Basophils, Absolute 0.10 10*3/mm3      nRBC 0.1 /100 WBC           Imaging Results (Last 24 Hours)     ** No results found for the last 24 hours. **      LAB RESULTS (LAST  7 DAYS)    CBC  Results from last 7 days   Lab Units 04/15/22  0310 04/13/22  0048 04/12/22  1745   WBC 10*3/mm3 7.00 5.80 5.50   RBC 10*6/mm3 4.39 4.25 4.43   HEMOGLOBIN g/dL 13.3 12.9* 13.7   HEMATOCRIT % 37.5 36.7* 38.5   MCV fL 85.5 86.4 86.8   PLATELETS 10*3/mm3 245 241 253       BMP  Results from last 7 days   Lab Units 04/15/22  0310 04/13/22  0048 04/12/22  1745   SODIUM mmol/L 139 141 139   POTASSIUM mmol/L 3.7 3.6 3.9   CHLORIDE mmol/L 103 104 102   CO2 mmol/L 23.0 25.0 25.0   BUN mg/dL 17 11 12   CREATININE mg/dL 0.80 0.86 0.96   GLUCOSE mg/dL 79 93 112*   MAGNESIUM mg/dL 1.8  --   --        CMP   Results from last 7 days   Lab Units 04/15/22  0310 04/13/22  0048 04/12/22  1745   SODIUM mmol/L 139 141 139   POTASSIUM mmol/L 3.7 3.6 3.9   CHLORIDE mmol/L 103 104 102   CO2 mmol/L 23.0 25.0 25.0   BUN mg/dL 17 11 12   CREATININE mg/dL 0.80 0.86 0.96   GLUCOSE mg/dL 79 93 112*   ALBUMIN g/dL  --  3.90 4.20   BILIRUBIN mg/dL  --  0.9 1.0   ALK PHOS U/L  --  104 116   AST (SGOT) U/L  --  21 20   ALT (SGPT) U/L  --  14 11         BNP        TROPONIN        CoAg  Results from last 7 days   Lab Units 04/12/22  1745   INR  1.01       Creatinine Clearance  Estimated Creatinine Clearance: 84.9 mL/min (by C-G formula based on SCr of 0.8 mg/dL).    ABG        Radiology  MRI Brain Without Contrast    Result Date: 4/13/2022  1.Incomplete examination as described above. 2.Several tiny subacute infarcts within left temporal lobe, left basal ganglia, and right frontal lobe. No large hemorrhagic transformation.  Electronically Signed By-Dallas Santiago MD On:4/13/2022 1:19 PM This report was finalized on [id number] by  Dallas Santiago MD.    FL Video Swallow With Speech Single Contrast    Result Date: 4/13/2022  1.Silent aspiration with thin liquid. 2.Penetration with nectar. 3.Please refer to speech pathologist report for further details.  Electronically Signed By-Dallas Santiago MD On:4/13/2022 11:47 AM This report  was finalized on [id number] by  Dallas Santiago MD.    CT Angiogram Carotids    Result Date: 4/13/2022   1. Cerebral vasculature appears patent without obvious abrupt, acute-appearing large vessel occlusion demonstrated. 2. No angiographic significant proximal internal carotid stenosis identified on either side. 3. Patent bilateral vertebral arteries left side dominant. Right vertebral artery appears to terminate in PICA. 4. Focal moderate grade stenosis, mid to distal P2 segment of the left posterior cerebral artery, likely atheromatous. 5. 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 6. Paranasal sinus disease. Recommend clinical correlation. 7. Additional findings, both vascular and nonvascular, as described.  Electronically Signed By-Tran Easley MD On:4/13/2022 12:50 PM This report was finalized on [id number] by  Tran Easley MD.    CT Angiogram Head    Result Date: 4/13/2022   1. Cerebral vasculature appears patent without obvious abrupt, acute-appearing large vessel occlusion demonstrated. 2. No angiographic significant proximal internal carotid stenosis identified on either side. 3. Patent bilateral vertebral arteries left side dominant. Right vertebral artery appears to terminate in PICA. 4. Focal moderate grade stenosis, mid to distal P2 segment of the left posterior cerebral artery, likely atheromatous. 5. 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 6. Paranasal sinus disease. Recommend clinical correlation. 7. Additional findings, both vascular and nonvascular, as described.  Electronically Signed By-Tran Easley MD On:4/13/2022 12:50 PM This report was finalized on [id number] by  Tran Easley MD.      Abnormal  Abnormal Labs Reviewed   COMPREHENSIVE METABOLIC PANEL - Abnormal; Notable for the following components:       Result Value    Glucose 112 (*)     All other components within normal limits    Narrative:     GFR Normal  >60                  Chronic Kidney Disease <60                  Kidney Failure <15                     URINALYSIS W/ CULTURE IF INDICATED - Abnormal; Notable for the following components:    Color, UA Dark Yellow (*)     All other components within normal limits    Narrative:     Urine microscopic not indicated.   CBC WITH AUTO DIFFERENTIAL - Abnormal; Notable for the following components:    Neutrophil % 79.9 (*)     Lymphocyte % 11.1 (*)     Lymphocytes, Absolute 0.60 (*)     All other components within normal limits   LIPID PANEL - Abnormal; Notable for the following components:    HDL Cholesterol 36 (*)     All other components within normal limits    Narrative:     Cholesterol Reference Ranges                  (U.S. Department of Health and Human Services ATP III Classifications)                                    Desirable          <200 mg/dL                  Borderline High    200-239 mg/dL                  High Risk          >240 mg/dL                                                      Triglyceride Reference Ranges                  (U.S. Department of Health and Human Services ATP III Classifications)                                    Normal           <150 mg/dL                  Borderline High  150-199 mg/dL                  High             200-499 mg/dL                  Very High        >500 mg/dL                                    HDL Reference Ranges                  (U.S. Department of Health and Human Services ATP III Classifications)                                    Low     <40 mg/dl (major risk factor for CHD)                  High    >60 mg/dl ('negative' risk factor for CHD)                                                                        LDL Reference Ranges                  (U.S. Department of Health and Human Services ATP III Classifications)                                    Optimal          <100 mg/dL                  Near Optimal     100-129 mg/dL                  Borderline  High  130-159 mg/dL                  High             160-189 mg/dL                  Very High        >189 mg/dL   CBC (NO DIFF) - Abnormal; Notable for the following components:    Hemoglobin 12.9 (*)     Hematocrit 36.7 (*)     All other components within normal limits   CBC WITH AUTO DIFFERENTIAL - Abnormal; Notable for the following components:    Neutrophil % 81.5 (*)     Lymphocyte % 9.3 (*)     All other components within normal limits   POCT GLUCOSE FINGERSTICK - Abnormal; Notable for the following components:    Glucose 111 (*)     All other components within normal limits   POCT GLUCOSE FINGERSTICK - Abnormal; Notable for the following components:    Glucose 106 (*)     All other components within normal limits            EKG                  I personally viewed and interpreted the patient's EKG/Telemetry data: Sinus rhythm right bundle branch block    ECHOCARDIOGRAM:    Results for orders placed during the hospital encounter of 04/12/22    Adult Transthoracic Echo Complete W/ Cont if Necessary Per Protocol (With Agitated Saline)    Interpretation Summary  · Estimated left ventricular EF = 70% Left ventricular systolic function is normal.    Indications  Cardioembolic source    Technically satisfactory study.  Mitral valve is structurally normal.  Tricuspid valve is structurally normal.  Aortic valve is structurally normal.  Pulmonic valve could not be well visualized.  No evidence for mitral tricuspid or aortic regurgitation is seen by Doppler study.  Left atrium is normal in size.  Right atrium is normal in size.  Left ventricle is normal in size and concentric left ventricle hypertrophy with ejection fraction of 70%.  Right ventricle is normal in size.  Atrial septum is intact.  Aorta is normal.  No pericardial effusion or intracardiac thrombus is seen.    Impression  Structurally and functionally normal cardiac valves.  Left ventricular size and concentric left ventricle hypertrophy with ejection  fraction of 70%.          STRESS MYOVIEW:    Cardiolite (Tc-99m Sestamibi) stress test    CARDIAC CATHETERIZATION:            OTHER:         Assessment/Plan     Principal Problem:    Suspected cerebrovascular accident (CVA)  Active Problems:    Aphasia    Essential hypertension    Dementia with behavioral disturbance (HCC)    Facial droop      ]]]]]]]]]]]]]]]]]]]]  Impression  ==========  -Recent speech difficulty and left facial droop.     Patient has multiple acute to subacute tiny strokes within left temporal lobe, left basal ganglia, right frontal lobe. Strokes are embolic within both the right and left MCA territory.   - CT head- no acute findings   - MRI brain-  Incomplete examination as described above. Several tiny subacute infarcts within left temporal lobe, left basal ganglia, and right frontal lobe. No large hemorrhagic transformation.  - CTA head and neck- Cerebral vasculature patent without obvious abrupt, acute-appearing large vessel occlusion. No significant ICA stenosis. Focal moderate grade stenosis mid to distal P2 segment of the left PCA.      Echocardiogram 4/14/2022 revealed concentric left ventricle hypertrophy and ejection fraction of 70%.     - Right bundle branch block     - History of hypertension.     - History of dementia.     -Denies having any surgical problems in the past.     - Non-smoker     - No known allergies  ============  Plan  ===========  Patient appears to have had probably cardioembolic episode.  Patient has history of hypertension  Patient has right bundle branch block.  Echocardiogram (transthoracic) as above  Patient will benefit from transesophageal echocardiogram today.  Risks and benefits pros and cons of the procedure including infection bleeding esophageal trauma anesthetic risk etc. were discussed with patient  Further plan will depend on patient's progress.  ]]]]]]]]]]]]]]]]]]]]]     Date of study  4/15/2022.    Procedure performed  Transesophageal echocardiogram  and Doppler study.  (Color continuous-wave and pulsed wave Doppler)    Procedure  Anesthesia was provided by anesthesiologist with intravenous Diprivan.  BRIAN probe could be passed without difficulty.  Patient tolerated the procedure well.  No complications were noted.    Results  Technically satisfactory study.  Mitral valve is structurally and functionally normal.  Aortic valve is tricuspid agent normal.  Tricuspid valve is normal.  Left atrium is mildly enlarged.  Left atrial appendage is enlarged without clot.  Left ventricle is normal in size and contractility with ejection fraction of 60%.  Right atrium and right ventricle are normal.  Atrial septum is intact without a PFO.  Aorta is normal.  No pericardial effusion is seen.    Impression  No evidence for intracardiac thrombus is present.  Left atrial enlargement.  Left atrial appendage enlargement.  Normal left ventricular size and contractility with ejection fraction of 60%.  No evidence for PFO is present.      Aramis Vilchis MD  04/15/22  06:38 EDT

## 2022-04-15 NOTE — PLAN OF CARE
Goal Outcome Evaluation:        Bed mobility - N/A or Not attempted. pt already out of the bed  Transfers - Min-A and with rolling walker sit to stand  Ambulation - 45 feet and 50 feet Min-A and with rolling walker  Moderately ataxic, cues to widen cain, pt veers to the right, pt unable to correct with verbal cues but with therapist moving walker back on path.    Pt would benefit from Inpatient Rehabilitation placement at discharge from facility   Pt desires Inpatient Rehabilitation placement at discharge. Pt cooperative; agreeable to therapeutic recommendations and plan of care.

## 2022-04-15 NOTE — OP NOTE
ESOPHAGOGASTRODUODENOSCOPY WITH PERCUTANEOUS ENDOSCOPIC GASTROSTOMY TUBE INSERTION Procedure Report    Patient Name:  Goran Dunbar  YOB: 1950    Date of Surgery:  4/15/2022     Pre-Op Diagnosis:  Feeding difficulty [R63.30]       Post-Op Diagnosis Codes:     * Feeding difficulty [R63.30]      Procedure/CPT® Codes:      Procedure(s):  ESOPHAGOGASTRODUODENOSCOPY WITH PERCUTANEOUS ENDOSCOPIC GASTROSTOMY TUBE INSERTION AND BIOPSY X1 AREA    Staff:  Surgeon(s):  Hernesto Nicholas MD      Anesthesia: Monitored Anesthesia Care    Description of Procedure:  A description of the procedure as well as risks, benefits and alternative methods were explained to the patient and or power of  who voiced understanding and signed the corresponding consent form. A physical exam was performed and vital signs were monitored throughout the procedure.    An upper GI endoscope was placed into the mouth and proceeded through the esophagus, stomach and second portion of the duodenum without difficulty. The scope was then retroflexed and the fundus was visualized. A 20 [place]oth[place] PEG was placed in the stomach body using standard Push technique. The procedure was not difficult and there were no immediate complications.    Findings:   Focal changes of gastritis was seen in the fundus area with some thickening of mucosa that site biopsy was performed.  After selecting appropriate spot with a blue band transillumination, area was cleaned with a Betadine, incision was made, trocar cannula was introduced under direct realization wire was placed and grabbed with a snare.  PEG tube was placed with the Ponsky technique    Impression:  1.  Successful PEG tube placement.  2.  Focal gastritis involving the fundus of the stomach biopsy was performed.  3.  Suspect atrophic gastritis    Recommendations:  Keep n.p.o. for now.  Continue with the PPI.  Patient will be placed on a very low-dose of Reglan for 48 hours.  Start tube feeding  in the morning.      Hernesto Nicholas MD     Date: 4/15/2022    Time: 14:50 EDT

## 2022-04-15 NOTE — ANESTHESIA PREPROCEDURE EVALUATION
Anesthesia Evaluation     Nursing notes reviewed   NPO Solid Status: > 8 hours  NPO Liquid Status: > 8 hours           Airway   Mallampati: II  TM distance: >3 FB  Neck ROM: full  No difficulty expected  Dental - normal exam     Pulmonary - normal exam   Cardiovascular - normal exam    (+) hypertension,       Neuro/Psych  GI/Hepatic/Renal/Endo      Musculoskeletal     Abdominal  - normal exam    Bowel sounds: normal.   Substance History      OB/GYN          Other                        Anesthesia Plan    ASA 3     MAC     intravenous induction     Anesthetic plan, all risks, benefits, and alternatives have been provided, discussed and informed consent has been obtained with: patient.        CODE STATUS:    Code Status (Patient has no pulse and is not breathing): CPR (Attempt to Resuscitate)  Medical Interventions (Patient has pulse or is breathing): Full Support

## 2022-04-15 NOTE — CONSULTS
GI CONSULT  NOTE:    Referring Provider:  Dr. Jackman    Chief complaint: Feeding difficulty    Subjective .     History of present illness: Goran Dunbar is a 72 y.o. male with history of dementia and hypertension who presented on 4/12/2022 with complaints of speech difficulties and left-sided facial droop.  History is extremely limited due to the patient's dementia and aphasia.  Therefore, history was supplemented via chart review.  The patient was reportedly in his normal state of health until the date of admission.  At that time, he awoke and was unable to speak.  The patient was noted to cough and choke when trying to eat breakfast.  He was brought to the emergency room and stroke work-up was initiated in the emergency department.  The patient was found to have multiple acute to subacute tiny strokes within the left temporal lobe, left basal ganglia, and right frontal lobe.  He was evaluated by speech therapy and has failed video swallow evaluation.  GI has been asked to consult due to feeding difficulty secondary to CVA.  The patient denies any abdominal pain.  No nausea/vomiting.  There have been no reports of bright red blood per rectum or melena.      Endo History:  No record of previous endoscopy.    Past Medical History:  Past Medical History:   Diagnosis Date   • Aphasia 4/12/2022   • Dementia with behavioral disturbance (HCC) 4/12/2022   • Essential hypertension 4/12/2022   • Facial droop 4/12/2022   • Suspected cerebrovascular accident (CVA) 4/12/2022       Past Surgical History:  No past surgical history on file.    Social History:  Social History     Tobacco Use   • Smoking status: Never Smoker   • Smokeless tobacco: Never Used   Substance Use Topics   • Alcohol use: Never   • Drug use: Never       Family History:  Family History   Family history unknown: Yes       Medications:  Medications Prior to Admission   Medication Sig Dispense Refill Last Dose   • amLODIPine (NORVASC) 10 MG tablet Take  10 mg by mouth Daily.      • memantine (NAMENDA) 5 MG tablet Take 5 mg by mouth Daily.      • metoprolol succinate XL (TOPROL-XL) 50 MG 24 hr tablet Take 50 mg by mouth Daily.      • potassium chloride (K-DUR,KLOR-CON) 20 MEQ CR tablet Take 20 mEq by mouth Daily.          Scheduled Meds:atorvastatin, 80 mg, Oral, Nightly  Barium Sulfate, 1 teaspoon(s), Oral, Once in imaging  cyanocobalamin, 1,000 mcg, Intramuscular, Q28 Days  folic acid, 1 mg, Oral, Daily  memantine, 5 mg, Oral, Nightly  metoprolol tartrate, 5 mg, Intravenous, Q6H  polyethylene glycol, 17 g, Oral, Daily  senna-docusate sodium, 2 tablet, Oral, BID      Continuous Infusions:sodium chloride, 75 mL/hr, Last Rate: 75 mL/hr (04/13/22 1207)      PRN Meds:.niCARdipine  •  risperiDONE  •  sodium chloride    ALLERGIES:  Patient has no known allergies.    ROS:  Unobtainable secondary to dementia and aphasia.    Objective     Vital Signs:   Vitals:    04/14/22 2205 04/15/22 0049 04/15/22 0151 04/15/22 0527   BP: 138/77 145/84 117/92 124/79   BP Location: Left arm  Left arm Right arm   Patient Position: Sitting  Sitting Lying   Pulse: 84  83 83   Resp: 11  15 14   Temp: 98.4 °F (36.9 °C)  98.1 °F (36.7 °C) 98.2 °F (36.8 °C)   TempSrc: Oral  Oral Oral   SpO2:       Weight:    71.9 kg (158 lb 8.2 oz)   Height:           Physical Exam:       General Appearance:    Awake and alert, in no acute distress   Head:    Normocephalic, without obvious abnormality, atraumatic   Throat:   No oral lesions, no thrush, oral mucosa moist   Lungs:     Respirations regular, even and unlabored   Chest Wall:    No abnormalities observed   Abdomen:     Soft, non-tender, no rebound or guarding, non-distended   Rectal:     Deferred   Extremities:   Moves all extremities, no edema, no cyanosis   Pulses:   Pulses palpable and equal bilaterally   Skin:   No rash, no jaundice, normal palpation   Lymph nodes:   No cervical, supraclavicular or submandibular palpable adenopathy   Neurologic:    Cranial nerves 2 - 12 grossly intact, no asterixis       Results Review:   I reviewed the patient's labs and imaging.  CBC    Results from last 7 days   Lab Units 04/15/22  0310 04/13/22  0048 04/12/22  1745   WBC 10*3/mm3 7.00 5.80 5.50   HEMOGLOBIN g/dL 13.3 12.9* 13.7   PLATELETS 10*3/mm3 245 241 253     CMP   Results from last 7 days   Lab Units 04/15/22  0310 04/13/22  0048 04/12/22  1745   SODIUM mmol/L 139 141 139   POTASSIUM mmol/L 3.7 3.6 3.9   CHLORIDE mmol/L 103 104 102   CO2 mmol/L 23.0 25.0 25.0   BUN mg/dL 17 11 12   CREATININE mg/dL 0.80 0.86 0.96   GLUCOSE mg/dL 79 93 112*   ALBUMIN g/dL  --  3.90 4.20   BILIRUBIN mg/dL  --  0.9 1.0   ALK PHOS U/L  --  104 116   AST (SGOT) U/L  --  21 20   ALT (SGPT) U/L  --  14 11   MAGNESIUM mg/dL 1.8  --   --      Cr Clearance Estimated Creatinine Clearance: 84.9 mL/min (by C-G formula based on SCr of 0.8 mg/dL).  Coag   Results from last 7 days   Lab Units 04/12/22  1745   INR  1.01     HbA1C   Lab Results   Component Value Date    HGBA1C 5.6 04/12/2022     Blood Glucose   Glucose   Date/Time Value Ref Range Status   04/13/2022 1148 106 (H) 70 - 105 mg/dL Final     Comment:     Serial Number: [id number]:  [id number]   04/13/2022 0632 77 70 - 105 mg/dL Final     Comment:     Serial Number: [id number]:  [id number]   04/12/2022 2338 94 70 - 105 mg/dL Final     Comment:     Serial Number: [id number]:  [id number]   04/12/2022 1746 111 (H) 70 - 105 mg/dL Final     Comment:     Serial Number: [id number]:  [id number]     Infection   Results from last 7 days   Lab Units 04/13/22  2333   PROCALCITONIN ng/mL 0.03     UA    Results from last 7 days   Lab Units 04/12/22  1805   NITRITE UA  Negative     Radiology(recent) MRI Brain Without Contrast    Result Date: 4/13/2022  1.Incomplete examination as described above. 2.Several tiny subacute infarcts within left temporal lobe, left basal ganglia, and right frontal lobe. No large hemorrhagic  transformation.  Electronically Signed By-Dallas Santiago MD On:4/13/2022 1:19 PM This report was finalized on [id number] by  Dallas Santiago MD.    FL Video Swallow With Speech Single Contrast    Result Date: 4/13/2022  1.Silent aspiration with thin liquid. 2.Penetration with nectar. 3.Please refer to speech pathologist report for further details.  Electronically Signed By-Dallas Santiago MD On:4/13/2022 11:47 AM This report was finalized on [id number] by  Dallas Santiago MD.    CT Angiogram Carotids    Result Date: 4/13/2022   1. Cerebral vasculature appears patent without obvious abrupt, acute-appearing large vessel occlusion demonstrated. 2. No angiographic significant proximal internal carotid stenosis identified on either side. 3. Patent bilateral vertebral arteries left side dominant. Right vertebral artery appears to terminate in PICA. 4. Focal moderate grade stenosis, mid to distal P2 segment of the left posterior cerebral artery, likely atheromatous. 5. 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 6. Paranasal sinus disease. Recommend clinical correlation. 7. Additional findings, both vascular and nonvascular, as described.  Electronically Signed By-Tran Easley MD On:4/13/2022 12:50 PM This report was finalized on [id number] by  Tran Easley MD.    CT Angiogram Head    Result Date: 4/13/2022   1. Cerebral vasculature appears patent without obvious abrupt, acute-appearing large vessel occlusion demonstrated. 2. No angiographic significant proximal internal carotid stenosis identified on either side. 3. Patent bilateral vertebral arteries left side dominant. Right vertebral artery appears to terminate in PICA. 4. Focal moderate grade stenosis, mid to distal P2 segment of the left posterior cerebral artery, likely atheromatous. 5. 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 6. Paranasal sinus disease. Recommend clinical correlation.  7. Additional findings, both vascular and nonvascular, as described.  Electronically Signed By-Tran Easley MD On:4/13/2022 12:50 PM This report was finalized on [id number] by  Tran Easley MD.         ASSESSMENT:  -Feeding difficulty secondary to acute CVA  -Dysphagia  -Aphasia  -Hypertension  -Dementia    PLAN:  Patient is a 72-year-old male with history of dementia and hypertension who presented on 4/12 with complaints of slurred speech, dysphagia, and left facial droop.  He was diagnosed with acute thrombotic CVA.  Neurology is following.  GI has been asked to consult secondary to feeding difficulty.  The patient has failed a video swallow evaluation this admission.  Speech therapy continues to follow the patient.  I have discussed failed swallow evaluations along with risk/benefits of EGD/PEG tube placement with the patient's daughter, Maribel.  She is agreeable to proceeding with EGD/PEG tube placement.  We will proceed with EGD/PEG tube placement today.  Check stat INR.  Maintain NPO.  Plan a dose of antibiotics 30 minutes prior to procedure.  Start PPI.  Consult dietitian for tube feed recommendations.  Supportive care.      I discussed the patients findings and my recommendations with the patient.  I will discuss case with Dr. Nicholas and change plan accordingly.    We appreciate the referral.    Electronically signed by DAYO Alonzo, 04/15/22, 9:42 AM EDT.

## 2022-04-15 NOTE — ANESTHESIA PREPROCEDURE EVALUATION
Anesthesia Evaluation     Patient summary reviewed and Nursing notes reviewed   NPO Solid Status: > 6 hours  NPO Liquid Status: > 6 hours           Airway   Mallampati: II  TM distance: >3 FB  Neck ROM: full  No difficulty expected  Dental - normal exam     Pulmonary - negative pulmonary ROS and normal exam    breath sounds clear to auscultation  Cardiovascular - normal exam    ECG reviewed  Rhythm: regular  Rate: normal    (+) hypertension,       Neuro/Psych  (+) psychiatric history, dementia,    GI/Hepatic/Renal/Endo - negative ROS     Musculoskeletal (-) negative ROS    Abdominal  - normal exam    Abdomen: soft.  Bowel sounds: normal.   Substance History - negative use     OB/GYN negative ob/gyn ROS         Other - negative ROS                       Anesthesia Plan    ASA 3     MAC     intravenous induction     Anesthetic plan, all risks, benefits, and alternatives have been provided, discussed and informed consent has been obtained with: patient.        CODE STATUS:    Code Status (Patient has no pulse and is not breathing): CPR (Attempt to Resuscitate)  Medical Interventions (Patient has pulse or is breathing): Full Support

## 2022-04-15 NOTE — ANESTHESIA POSTPROCEDURE EVALUATION
Patient: Goran Dunbar    Procedure Summary     Date: 04/15/22 Room / Location: [facility] ENDOSCOPY 4 / [facility] ENDOSCOPY    Anesthesia Start: 1434 Anesthesia Stop: 1453    Procedure: ESOPHAGOGASTRODUODENOSCOPY WITH PERCUTANEOUS ENDOSCOPIC GASTROSTOMY TUBE INSERTION AND BIOPSY X1 AREA (N/A ) Diagnosis:       Feeding difficulty      (Feeding difficulty [R63.30])    Surgeons: Hernesto Nicholas MD Provider: Gualberto Tovar MD    Anesthesia Type: MAC ASA Status: 3          Anesthesia Type: MAC    Vitals  No vitals data found for the desired time range.          Post Anesthesia Care and Evaluation    Patient location during evaluation: bedside  Patient participation: complete - patient participated  Level of consciousness: awake and alert  Pain score: 1  Pain management: adequate  Airway patency: patent  Anesthetic complications: No anesthetic complications  PONV Status: none  Cardiovascular status: acceptable  Respiratory status: acceptable  Hydration status: acceptable  Post Neuraxial Block status: Motor and sensory function returned to baseline

## 2022-04-15 NOTE — CASE MANAGEMENT/SOCIAL WORK
Continued Stay Note   [facility]     Patient Name: Goran Dunbar  MRN: [id number]  Today's Date: 4/15/2022    Admit Date: 4/12/2022     Discharge Plan     Row Name 04/15/22 0822       Plan    Plan Referral sent to [facility] (Pending). No precert required. PASRR per facility. Barrier: Need accepting IP Rehab & determine plan for NPO.    Plan Comments CM received return call from patient's daughter (Katlin) with their preferences. 1. [facility], 2. [facility] & 3. [facility]. CM sent referal to [facility] and liaison (Mecca) notified.    Row Name 04/15/22 0815                    Expected Discharge Date and Time     Expected Discharge Date Expected Discharge Time    Apr 18, 2022         Phone communication or documentation only - no physical contact with patient or family.    Jacki Valentine, RN, MSN  Care Manager  [phone]

## 2022-04-15 NOTE — CASE MANAGEMENT/SOCIAL WORK
Continued Stay Note  [facility]     Patient Name: Goran Dunbar  MRN: [id number]  Today's Date: 4/15/2022    Admit Date: 4/12/2022     Discharge Plan     Row Name 04/15/22 1538       Plan    Plan [facility] and will have bed on Sat (4/16/22). No precert required. PASRR per facility. Peg placement today 4/15. Needs ot be sitter free 24 hrs.    Plan Comments [facility] accepted and will have bed on Sat (4/16/22). No precert required. PASRR per facility. Peg placement today 4/15. Needs ot be sitter free 24 hrs.    Row Name 04/15/22 1305                       Expected Discharge Date and Time     Expected Discharge Date Expected Discharge Time    Apr 18, 2022         Met with patient in room wearing PPE: mask, face shield/goggles, gloves, gown.      Maintained distance greater than six feet and spent less than 15 minutes in the room.    Jacki Valentine RN, MSN  Care Manager  [phone]

## 2022-04-16 ENCOUNTER — INPATIENT HOSPITAL (OUTPATIENT)
Dept: URBAN - METROPOLITAN AREA HOSPITAL 84 | Facility: HOSPITAL | Age: 72
End: 2022-04-16

## 2022-04-16 DIAGNOSIS — R13.10 DYSPHAGIA, UNSPECIFIED: ICD-10-CM

## 2022-04-16 DIAGNOSIS — I10 ESSENTIAL (PRIMARY) HYPERTENSION: ICD-10-CM

## 2022-04-16 DIAGNOSIS — R63.30 FEEDING DIFFICULTIES, UNSPECIFIED: ICD-10-CM

## 2022-04-16 DIAGNOSIS — R47.01 APHASIA: ICD-10-CM

## 2022-04-16 DIAGNOSIS — F03.90 UNSPECIFIED DEMENTIA, UNSPECIFIED SEVERITY, WITHOUT BEHAVIOR: ICD-10-CM

## 2022-04-16 LAB
ALBUMIN SERPL-MCNC: 3.6 G/DL (ref 3.5–5.2)
ALBUMIN/GLOB SERPL: 1.1 G/DL
ALP SERPL-CCNC: 107 U/L (ref 39–117)
ALT SERPL W P-5'-P-CCNC: 10 U/L (ref 1–41)
ANION GAP SERPL CALCULATED.3IONS-SCNC: 16 MMOL/L (ref 5–15)
AST SERPL-CCNC: 23 U/L (ref 1–40)
BASOPHILS # BLD AUTO: 0 10*3/MM3 (ref 0–0.2)
BASOPHILS NFR BLD AUTO: 0.5 % (ref 0–1.5)
BILIRUB SERPL-MCNC: 1.7 MG/DL (ref 0–1.2)
BUN SERPL-MCNC: 13 MG/DL (ref 8–23)
BUN/CREAT SERPL: 17.6 (ref 7–25)
CALCIUM SPEC-SCNC: 9 MG/DL (ref 8.6–10.5)
CHLORIDE SERPL-SCNC: 102 MMOL/L (ref 98–107)
CO2 SERPL-SCNC: 22 MMOL/L (ref 22–29)
CREAT SERPL-MCNC: 0.74 MG/DL (ref 0.76–1.27)
DEPRECATED RDW RBC AUTO: 39.4 FL (ref 37–54)
EGFRCR SERPLBLD CKD-EPI 2021: 96.3 ML/MIN/1.73
EOSINOPHIL # BLD AUTO: 0 10*3/MM3 (ref 0–0.4)
EOSINOPHIL NFR BLD AUTO: 0.4 % (ref 0.3–6.2)
ERYTHROCYTE [DISTWIDTH] IN BLOOD BY AUTOMATED COUNT: 13 % (ref 12.3–15.4)
GLOBULIN UR ELPH-MCNC: 3.2 GM/DL
GLUCOSE BLDC GLUCOMTR-MCNC: 96 MG/DL (ref 70–105)
GLUCOSE SERPL-MCNC: 66 MG/DL (ref 65–99)
HCT VFR BLD AUTO: 34.2 % (ref 37.5–51)
HGB BLD-MCNC: 12.2 G/DL (ref 13–17.7)
LYMPHOCYTES # BLD AUTO: 0.7 10*3/MM3 (ref 0.7–3.1)
LYMPHOCYTES NFR BLD AUTO: 9.6 % (ref 19.6–45.3)
MAGNESIUM SERPL-MCNC: 1.8 MG/DL (ref 1.6–2.4)
MCH RBC QN AUTO: 30.4 PG (ref 26.6–33)
MCHC RBC AUTO-ENTMCNC: 35.8 G/DL (ref 31.5–35.7)
MCV RBC AUTO: 85.1 FL (ref 79–97)
MONOCYTES # BLD AUTO: 0.5 10*3/MM3 (ref 0.1–0.9)
MONOCYTES NFR BLD AUTO: 6.5 % (ref 5–12)
NEUTROPHILS NFR BLD AUTO: 6.5 10*3/MM3 (ref 1.7–7)
NEUTROPHILS NFR BLD AUTO: 83 % (ref 42.7–76)
NRBC BLD AUTO-RTO: 0.1 /100 WBC (ref 0–0.2)
PLATELET # BLD AUTO: 224 10*3/MM3 (ref 140–450)
PMV BLD AUTO: 8.9 FL (ref 6–12)
POTASSIUM SERPL-SCNC: 3.2 MMOL/L (ref 3.5–5.2)
PROT SERPL-MCNC: 6.8 G/DL (ref 6–8.5)
RBC # BLD AUTO: 4.02 10*6/MM3 (ref 4.14–5.8)
SODIUM SERPL-SCNC: 140 MMOL/L (ref 136–145)
WBC NRBC COR # BLD: 7.8 10*3/MM3 (ref 3.4–10.8)

## 2022-04-16 PROCEDURE — 99231 SBSQ HOSP IP/OBS SF/LOW 25: CPT | Performed by: NURSE PRACTITIONER

## 2022-04-16 PROCEDURE — 99232 SBSQ HOSP IP/OBS MODERATE 35: CPT | Performed by: INTERNAL MEDICINE

## 2022-04-16 PROCEDURE — 97530 THERAPEUTIC ACTIVITIES: CPT

## 2022-04-16 PROCEDURE — 83735 ASSAY OF MAGNESIUM: CPT | Performed by: HOSPITALIST

## 2022-04-16 PROCEDURE — 85025 COMPLETE CBC W/AUTO DIFF WBC: CPT | Performed by: HOSPITALIST

## 2022-04-16 PROCEDURE — 82962 GLUCOSE BLOOD TEST: CPT

## 2022-04-16 PROCEDURE — 97116 GAIT TRAINING THERAPY: CPT

## 2022-04-16 PROCEDURE — 25010000002 METOCLOPRAMIDE PER 10 MG: Performed by: INTERNAL MEDICINE

## 2022-04-16 PROCEDURE — 80053 COMPREHEN METABOLIC PANEL: CPT | Performed by: NURSE PRACTITIONER

## 2022-04-16 RX ORDER — DEXTROSE MONOHYDRATE 50 MG/ML
50 INJECTION, SOLUTION INTRAVENOUS CONTINUOUS
Status: DISCONTINUED | OUTPATIENT
Start: 2022-04-16 | End: 2022-04-17 | Stop reason: HOSPADM

## 2022-04-16 RX ADMIN — Medication 3 ML: at 22:21

## 2022-04-16 RX ADMIN — ASPIRIN 324 MG: 81 TABLET, CHEWABLE ORAL at 08:42

## 2022-04-16 RX ADMIN — SENNOSIDES AND DOCUSATE SODIUM 2 TABLET: 50; 8.6 TABLET ORAL at 08:42

## 2022-04-16 RX ADMIN — METOCLOPRAMIDE 5 MG: 5 INJECTION, SOLUTION INTRAMUSCULAR; INTRAVENOUS at 08:42

## 2022-04-16 RX ADMIN — POLYETHYLENE GLYCOL 3350 17 G: 17 POWDER, FOR SOLUTION ORAL at 08:42

## 2022-04-16 RX ADMIN — ATORVASTATIN CALCIUM 80 MG: 40 TABLET, FILM COATED ORAL at 22:17

## 2022-04-16 RX ADMIN — Medication 3 ML: at 08:43

## 2022-04-16 RX ADMIN — SODIUM CHLORIDE 75 ML/HR: 9 INJECTION, SOLUTION INTRAVENOUS at 22:19

## 2022-04-16 RX ADMIN — METOCLOPRAMIDE 5 MG: 5 INJECTION, SOLUTION INTRAMUSCULAR; INTRAVENOUS at 22:16

## 2022-04-16 RX ADMIN — RISPERIDONE 0.5 MG: 0.25 TABLET ORAL at 08:42

## 2022-04-16 RX ADMIN — MEMANTINE HYDROCHLORIDE 5 MG: 5 TABLET ORAL at 22:17

## 2022-04-16 RX ADMIN — FOLIC ACID 1 MG: 1 TABLET ORAL at 08:43

## 2022-04-16 RX ADMIN — METOPROLOL TARTRATE 5 MG: 5 INJECTION, SOLUTION INTRAVENOUS at 17:02

## 2022-04-16 RX ADMIN — PANTOPRAZOLE SODIUM 40 MG: 40 INJECTION, POWDER, FOR SOLUTION INTRAVENOUS at 17:02

## 2022-04-16 RX ADMIN — DEXTROSE MONOHYDRATE 50 ML/HR: 50 INJECTION, SOLUTION INTRAVENOUS at 04:11

## 2022-04-16 RX ADMIN — METOCLOPRAMIDE 5 MG: 5 INJECTION, SOLUTION INTRAMUSCULAR; INTRAVENOUS at 17:02

## 2022-04-16 RX ADMIN — METOPROLOL TARTRATE 5 MG: 5 INJECTION, SOLUTION INTRAVENOUS at 22:16

## 2022-04-16 RX ADMIN — PANTOPRAZOLE SODIUM 40 MG: 40 INJECTION, POWDER, FOR SOLUTION INTRAVENOUS at 08:42

## 2022-04-16 RX ADMIN — SENNOSIDES AND DOCUSATE SODIUM 2 TABLET: 50; 8.6 TABLET ORAL at 22:17

## 2022-04-16 RX ADMIN — METOPROLOL TARTRATE 5 MG: 5 INJECTION, SOLUTION INTRAVENOUS at 02:14

## 2022-04-16 RX ADMIN — METOPROLOL TARTRATE 5 MG: 5 INJECTION, SOLUTION INTRAVENOUS at 08:42

## 2022-04-16 NOTE — PLAN OF CARE
Goal Outcome Evaluation:           Problem: Adult Inpatient Plan of Care  Goal: Absence of Hospital-Acquired Illness or Injury  Intervention: Identify and Manage Fall Risk  Recent Flowsheet Documentation  Taken 4/16/2022 1748 by Holly, Magy, RN  Safety Promotion/Fall Prevention:   safety round/check completed   room organization consistent   nonskid shoes/slippers when out of bed   fall prevention program maintained   clutter free environment maintained   activity supervised   assistive device/personal items within reach  Taken 4/16/2022 1600 by Holly, Magy, RN  Safety Promotion/Fall Prevention:   safety round/check completed   room organization consistent   nonskid shoes/slippers when out of bed   fall prevention program maintained   clutter free environment maintained   assistive device/personal items within reach   activity supervised  Taken 4/16/2022 1400 by Holly, Magy, RN  Safety Promotion/Fall Prevention:   safety round/check completed   room organization consistent   nonskid shoes/slippers when out of bed   fall prevention program maintained   assistive device/personal items within reach   clutter free environment maintained   activity supervised  Taken 4/16/2022 1200 by [place], Magy, RN  Safety Promotion/Fall Prevention:   safety round/check completed   room organization consistent   nonskid shoes/slippers when out of bed   fall prevention program maintained   clutter free environment maintained   assistive device/personal items within reach   activity supervised  Taken 4/16/2022 1000 by Magy Barrera, RN  Safety Promotion/Fall Prevention:   safety round/check completed   room organization consistent   nonskid shoes/slippers when out of bed   fall prevention program maintained   clutter free environment maintained   assistive device/personal items within reach   activity supervised  Taken 4/16/2022 0800 by Magy Barrera, RN  Safety Promotion/Fall Prevention:    safety round/check completed   room organization consistent   nonskid shoes/slippers when out of bed   fall prevention program maintained   clutter free environment maintained   assistive device/personal items within reach   activity supervised     Problem: Adult Inpatient Plan of Care  Goal: Absence of Hospital-Acquired Illness or Injury  Intervention: Prevent Skin Injury  Recent Flowsheet Documentation  Taken 4/16/2022 0800 by Magy Barrera, RN  Skin Protection:   adhesive use limited   incontinence pads utilized

## 2022-04-16 NOTE — THERAPY TREATMENT NOTE
Subjective: Pt agreeable to therapeutic plan of care.    Objective:     Bed mobility - N/A or Not attempted.  Transfers - Min-A  Ambulation - 40 feet CGA and Min-A    Pain: 0 VAS  Education: Provided education on importance of mobility and skilled verbal / tactile cueing throughout intervention.     Assessment: Goran Dunbar presents with functional mobility impairments which indicate the need for skilled intervention. Pt resting in recliner at start of tx. Pt able to respond to questions appropriately via written language. Pt requiring min a for STS from recliner x2 attempts and CGA/min a for amb within room with FWW. Pt initially demo's shuffling with decreased step length. Pt able to follow cuing for improved step length and foot clearance for improved overall gait mechanics. Cuing required for improved continuity of movement during turns. Pt josie amb 40ft x2 with rest period between due to pt fatigue.  Tolerating session today without incident. Will continue to follow and progress as tolerated.     Plan/Recommendations:   Pt would benefit from Inpatient Rehabilitation placement at discharge from facility and requires no DME at discharge.   Pt desires Inpatient Rehabilitation placement at discharge. Pt cooperative; agreeable to therapeutic recommendations and plan of care.     Basic Mobility 6-click:  Rollin = Total, A lot = 2, A little = 3; 4 = None  Supine>Sit:   1 = Total, A lot = 2, A little = 3; 4 = None   Sit>Stand with arms:  1 = Total, A lot = 2, A little = 3; 4 = None  Bed>Chair:   1 = Total, A lot = 2, A little = 3; 4 = None  Ambulate in room:  1 = Total, A lot = 2, A little = 3; 4 = None  3-5 Steps with railin = Total, A lot = 2, A little = 3; 4 = None  Score: 16    Modified Gurdeep: 4 = Moderately severe disability (Unable to attend to own bodily needs without assistance, and unable to walk unassisted)     Post-Tx Position: Up in Chair, Alarms activated and Call light and  personal items within reach  PPE: gloves, surgical mask, eyewear protection

## 2022-04-16 NOTE — PLAN OF CARE
Goal Outcome Evaluation:                 Problem: Adult Inpatient Plan of Care  Goal: Optimal Comfort and Wellbeing  Intervention: Provide Person-Centered Care  Recent Flowsheet Documentation  Taken 4/16/2022 0400 by Tiffany Palacio RN  Trust Relationship/Rapport:   care explained   questions answered   questions encouraged  Taken 4/16/2022 0000 by Tiffany Palacio RN  Trust Relationship/Rapport:   care explained   questions answered   questions encouraged  Taken 4/15/2022 2000 by Tiffany Palacio RN  Trust Relationship/Rapport:   care explained   questions answered   questions encourage  Pt had 1 attempt out of bed. Pt family at bedside through the night unitl 350 am. Pt has been calm and pleasant.

## 2022-04-16 NOTE — PLAN OF CARE
Assessment: Goran Dunbar presents with functional mobility impairments which indicate the need for skilled intervention. Pt resting in recliner at start of tx. Pt able to respond to questions appropriately via written language. Pt requiring min a for STS from recliner x2 attempts and CGA/min a for amb within room with FWW. Pt initially demo's shuffling with decreased step length. Pt able to follow cuing for improved step length and foot clearance for improved overall gait mechanics. Cuing required for improved continuity of movement during turns. Pt josie amb 40ft x2 with rest period between due to pt fatigue.  Tolerating session today without incident. Will continue to follow and progress as tolerated.     Plan/Recommendations:   Pt would benefit from Inpatient Rehabilitation placement at discharge from facility and requires no DME at discharge.   Pt desires Inpatient Rehabilitation placement at discharge. Pt cooperative; agreeable to therapeutic recommendations and plan of care.

## 2022-04-16 NOTE — PROGRESS NOTES
Nutrition Services    Patient Name: Goran Dunbar  YOB: 1950  MRN: [id number]  Admission date: 4/12/2022    PPE Documentation        PPE Worn By Provider Did not enter room for this encounter   PPE Worn By Patient  N/A     PROGRESS NOTE      Encounter Information: 4/16: Progress note to monitor tube feeding. Per secure message with pt's RN, TF initiated this AM per orders.       PO Diet: NPO Diet NPO Type: Strict NPO   PO Supplements:    PO Intake:  NPO       Nutrition support orders: Isosource 1.5 at 30mL/hr + 10mL/hr water flush    Nutrition support review: Infusing as above per nursing       Labs (reviewed below): Hypokalemia  Creat low        GI Function:  No BM since admit, on bowel regimen         Nutrition Intervention: TF at initial goal rate, Isosource 1.5 @30mL/hour  Will reassess for increase tomorrow       Results from last 7 days   Lab Units 04/16/22  0041 04/15/22  0310 04/13/22 0048 04/12/22  1745   SODIUM mmol/L 140 139 141 139   POTASSIUM mmol/L 3.2* 3.7 3.6 3.9   CHLORIDE mmol/L 102 103 104 102   CO2 mmol/L 22.0 23.0 25.0 25.0   BUN mg/dL 13 17 11 12   CREATININE mg/dL 0.74* 0.80 0.86 0.96   CALCIUM mg/dL 9.0 9.5 9.0 9.2   BILIRUBIN mg/dL 1.7*  --  0.9 1.0   ALK PHOS U/L 107  --  104 116   ALT (SGPT) U/L 10  --  14 11   AST (SGOT) U/L 23  --  21 20   GLUCOSE mg/dL 66 79 93 112*     Results from last 7 days   Lab Units 04/16/22  0041 04/15/22  0310 04/13/22 0048 04/12/22  1745   MAGNESIUM mg/dL 1.8 1.8  --   --    HEMOGLOBIN g/dL 12.2* 13.3   < > 13.7   HEMATOCRIT % 34.2* 37.5   < > 38.5   TRIGLYCERIDES mg/dL  --   --   --  99    < > = values in this interval not displayed.     COVID19   Date Value Ref Range Status   04/12/2022 Not Detected Not Detected - Ref. Range Final     Lab Results   Component Value Date    HGBA1C 5.6 04/12/2022       RD to follow up per protocol.    Electronically signed by:  Tiffany Bautista RD  04/16/22 10:12 EDT

## 2022-04-16 NOTE — PROGRESS NOTES
Referring Provider: Jewel Wyatt,*    Reason for follow-up:  Recent stroke  Cardioembolic source.  Hypertension     Patient Care Team:  Suzanna Posada PA as PCP - General (Physician Assistant)    Subjective .  Doing okay     ROS    Since I have last seen, the patient has been without any chest discomfort ,shortness of breath, palpitations, dizziness or syncope.  Denies having any headache ,abdominal pain ,nausea, vomiting , diarrhea constipation, loss of weight or loss of appetite.  Denies having any excessive bruising ,hematuria or blood in the stool.    Review of all systems negative except as indicated    History  Past Medical History:   Diagnosis Date   • Aphasia 4/12/2022   • Dementia with behavioral disturbance (HCC) 4/12/2022   • Essential hypertension 4/12/2022   • Facial droop 4/12/2022   • Suspected cerebrovascular accident (CVA) 4/12/2022       History reviewed. No pertinent surgical history.    Family History   Family history unknown: Yes       Social History     Tobacco Use   • Smoking status: Never Smoker   • Smokeless tobacco: Never Used   Substance Use Topics   • Alcohol use: Never   • Drug use: Never        Medications Prior to Admission   Medication Sig Dispense Refill Last Dose   • amLODIPine (NORVASC) 10 MG tablet Take 10 mg by mouth Daily.      • memantine (NAMENDA) 5 MG tablet Take 5 mg by mouth Daily.      • metoprolol succinate XL (TOPROL-XL) 50 MG 24 hr tablet Take 50 mg by mouth Daily.      • potassium chloride (K-DUR,KLOR-CON) 20 MEQ CR tablet Take 20 mEq by mouth Daily.          Allergies  Patient has no known allergies.    Scheduled Meds:aspirin, 324 mg, Oral, Daily   Or  aspirin, 300 mg, Rectal, Daily  atorvastatin, 80 mg, Oral, Nightly  Barium Sulfate, 1 teaspoon(s), Oral, Once in imaging  ceFAZolin, 2 g, Intravenous, Once  cyanocobalamin, 1,000 mcg, Intramuscular, Q28 Days  folic acid, 1 mg, Oral, Daily  memantine, 5 mg, Oral, Nightly  metoclopramide, 5 mg, Intravenous,  "TID  metoprolol tartrate, 5 mg, Intravenous, Q6H  pantoprazole, 40 mg, Intravenous, BID AC  polyethylene glycol, 17 g, Oral, Daily  senna-docusate sodium, 2 tablet, Oral, BID  sodium chloride, 3 mL, Intravenous, Q12H      Continuous Infusions:dextrose, 50 mL/hr, Last Rate: 50 mL/hr (04/16/22 0411)  sodium chloride, 75 mL/hr, Last Rate: 75 mL/hr (04/15/22 1302)      PRN Meds:.niCARdipine  •  risperiDONE  •  sodium chloride  •  sodium chloride    Objective     VITAL SIGNS  Vitals:    04/15/22 1738 04/15/22 2158 04/16/22 0133 04/16/22 0521   BP: 173/92 152/87 135/70 143/70   BP Location: Left arm Left arm Left arm Left arm   Patient Position: Lying Lying Lying Lying   Pulse: 76 84 63 62   Resp: 11 17 15 14   Temp: 98.6 °F (37 °C) 98.8 °F (37.1 °C) 98.4 °F (36.9 °C) 98.2 °F (36.8 °C)   TempSrc: Oral Oral Oral Oral   SpO2: 95% 96% 97% 94%   Weight:    72.2 kg (159 lb 3.2 oz)   Height:           Flowsheet Rows    Flowsheet Row First Filed Value   Admission Height 177.8 cm (70\") Documented at 04/12/2022 1723   Admission Weight 64.7 kg (142 lb 10.2 oz) Documented at 04/12/2022 2331            Intake/Output Summary (Last 24 hours) at 4/16/2022 0737  Last data filed at 4/15/2022 1514  Gross per 24 hour   Intake 75 ml   Output 200 ml   Net -125 ml        TELEMETRY: Sinus rhythm    Physical Exam:  The patient is alert, oriented and in no distress.  Vital signs as noted above.  Head and neck revealed no carotid bruits or jugular venous distention.  No thyromegaly or lymphadenopathy is present  Lungs clear.  No wheezing.  Breath sounds are normal bilaterally.  Heart normal first and second heart sounds.  No murmur. No precordial rub is present.  No gallop is present.  Abdomen soft and nontender.  No organomegaly is present.  Extremities with good peripheral pulses without any pedal edema.  Skin warm and dry.  Musculoskeletal system is grossly normal  CNS-dysarthria    Results Review:   I reviewed the patient's new clinical " results.  Lab Results (last 24 hours)     Procedure Component Value Units Date/Time    POC Glucose Once [[id number]]  (Normal) Collected: 04/16/22 0713    Specimen: Blood Updated: 04/16/22 0714     Glucose 96 mg/dL      Comment: Serial Number: [id number]:  [id number]       Magnesium [[id number]]  (Normal) Collected: 04/16/22 0041    Specimen: Blood Updated: 04/16/22 0313     Magnesium 1.8 mg/dL     Comprehensive Metabolic Panel [[id number]]  (Abnormal) Collected: 04/16/22 0041    Specimen: Blood Updated: 04/16/22 0313     Glucose 66 mg/dL      Comment: Results called to, read back and acknowledged by Irina at 04/16/22 03:13 EDT          BUN 13 mg/dL      Creatinine 0.74 mg/dL      Sodium 140 mmol/L      Potassium 3.2 mmol/L      Chloride 102 mmol/L      CO2 22.0 mmol/L      Calcium 9.0 mg/dL      Total Protein 6.8 g/dL      Albumin 3.60 g/dL      ALT (SGPT) 10 U/L      AST (SGOT) 23 U/L      Alkaline Phosphatase 107 U/L      Total Bilirubin 1.7 mg/dL      Globulin 3.2 gm/dL      A/G Ratio 1.1 g/dL      BUN/Creatinine Ratio 17.6     Anion Gap 16.0 mmol/L      eGFR 96.3 mL/min/1.73      Comment: National Kidney Foundation and American Society of Nephrology (ASN) Task Force recommended calculation based on the Chronic Kidney Disease Epidemiology Collaboration (CKD-EPI) equation refit without adjustment for race.       Narrative:      GFR Normal >60  Chronic Kidney Disease <60  Kidney Failure <15      CBC & Differential [[id number]]  (Abnormal) Collected: 04/16/22 0041    Specimen: Blood Updated: 04/16/22 0230    Narrative:      The following orders were created for panel order CBC & Differential.  Procedure                               Abnormality         Status                     ---------                               -----------         ------                     CBC Auto Differential[[id number]]        Abnormal            Final result                 Please view results for these tests on the individual  orders.    CBC Auto Differential [[id number]]  (Abnormal) Collected: 04/16/22 0041    Specimen: Blood Updated: 04/16/22 0230     WBC 7.80 10*3/mm3      RBC 4.02 10*6/mm3      Hemoglobin 12.2 g/dL      Hematocrit 34.2 %      MCV 85.1 fL      MCH 30.4 pg      MCHC 35.8 g/dL      RDW 13.0 %      RDW-SD 39.4 fl      MPV 8.9 fL      Platelets 224 10*3/mm3      Neutrophil % 83.0 %      Lymphocyte % 9.6 %      Monocyte % 6.5 %      Eosinophil % 0.4 %      Basophil % 0.5 %      Neutrophils, Absolute 6.50 10*3/mm3      Lymphocytes, Absolute 0.70 10*3/mm3      Monocytes, Absolute 0.50 10*3/mm3      Eosinophils, Absolute 0.00 10*3/mm3      Basophils, Absolute 0.00 10*3/mm3      nRBC 0.1 /100 WBC     Extra Tubes [[id number]] Collected: 04/15/22 1009    Specimen: Blood, Venous Line Updated: 04/15/22 1119    Narrative:      The following orders were created for panel order Extra Tubes.  Procedure                               Abnormality         Status                     ---------                               -----------         ------                     Gold Top - SST[[id number]]                                   Final result                 Please view results for these tests on the individual orders.    Gold Top - SST [[id number]] Collected: 04/15/22 1009    Specimen: Blood Updated: 04/15/22 1119     Extra Tube Hold for add-ons.     Comment: Auto resulted.       Protime-INR [[id number]]  (Normal) Collected: 04/15/22 1009    Specimen: Blood Updated: 04/15/22 1048     Protime 10.8 Seconds      INR 1.05          Imaging Results (Last 24 Hours)     ** No results found for the last 24 hours. **      LAB RESULTS (LAST 7 DAYS)    CBC  Results from last 7 days   Lab Units 04/16/22  0041 04/15/22  0310 04/13/22  0048 04/12/22  1745   WBC 10*3/mm3 7.80 7.00 5.80 5.50   RBC 10*6/mm3 4.02* 4.39 4.25 4.43   HEMOGLOBIN g/dL 12.2* 13.3 12.9* 13.7   HEMATOCRIT % 34.2* 37.5 36.7* 38.5   MCV fL 85.1 85.5 86.4 86.8   PLATELETS 10*3/mm3 224 245 241  253       BMP  Results from last 7 days   Lab Units 04/16/22  0041 04/15/22  0310 04/13/22  0048 04/12/22  1745   SODIUM mmol/L 140 139 141 139   POTASSIUM mmol/L 3.2* 3.7 3.6 3.9   CHLORIDE mmol/L 102 103 104 102   CO2 mmol/L 22.0 23.0 25.0 25.0   BUN mg/dL 13 17 11 12   CREATININE mg/dL 0.74* 0.80 0.86 0.96   GLUCOSE mg/dL 66 79 93 112*   MAGNESIUM mg/dL 1.8 1.8  --   --        CMP   Results from last 7 days   Lab Units 04/16/22  0041 04/15/22  0310 04/13/22  0048 04/12/22  1745   SODIUM mmol/L 140 139 141 139   POTASSIUM mmol/L 3.2* 3.7 3.6 3.9   CHLORIDE mmol/L 102 103 104 102   CO2 mmol/L 22.0 23.0 25.0 25.0   BUN mg/dL 13 17 11 12   CREATININE mg/dL 0.74* 0.80 0.86 0.96   GLUCOSE mg/dL 66 79 93 112*   ALBUMIN g/dL 3.60  --  3.90 4.20   BILIRUBIN mg/dL 1.7*  --  0.9 1.0   ALK PHOS U/L 107  --  104 116   AST (SGOT) U/L 23  --  21 20   ALT (SGPT) U/L 10  --  14 11         BNP        TROPONIN        CoAg  Results from last 7 days   Lab Units 04/15/22  1009 04/12/22  1745   INR  1.05 1.01       Creatinine Clearance  Estimated Creatinine Clearance: 92.1 mL/min (A) (by C-G formula based on SCr of 0.74 mg/dL (L)).    ABG        Radiology  No radiology results for the last day    Abnormal  Abnormal Labs Reviewed   COMPREHENSIVE METABOLIC PANEL - Abnormal; Notable for the following components:       Result Value    Glucose 112 (*)     All other components within normal limits    Narrative:     GFR Normal >60  Chronic Kidney Disease <60  Kidney Failure <15     URINALYSIS W/ CULTURE IF INDICATED - Abnormal; Notable for the following components:    Color, UA Dark Yellow (*)     All other components within normal limits    Narrative:     Urine microscopic not indicated.   CBC WITH AUTO DIFFERENTIAL - Abnormal; Notable for the following components:    Neutrophil % 79.9 (*)     Lymphocyte % 11.1 (*)     Lymphocytes, Absolute 0.60 (*)     All other components within normal limits   LIPID PANEL - Abnormal; Notable for the  following components:    HDL Cholesterol 36 (*)     All other components within normal limits    Narrative:     Cholesterol Reference Ranges  (U.S. Department of Health and Human Services ATP III Classifications)    Desirable          <200 mg/dL  Borderline High    200-239 mg/dL  High Risk          >240 mg/dL      Triglyceride Reference Ranges  (U.S. Department of Health and Human Services ATP III Classifications)    Normal           <150 mg/dL  Borderline High  150-199 mg/dL  High             200-499 mg/dL  Very High        >500 mg/dL    HDL Reference Ranges  (U.S. Department of Health and Human Services ATP III Classifications)    Low     <40 mg/dl (major risk factor for CHD)  High    >60 mg/dl ('negative' risk factor for CHD)        LDL Reference Ranges  (U.S. Department of Health and Human Services ATP III Classifications)    Optimal          <100 mg/dL  Near Optimal     100-129 mg/dL  Borderline High  130-159 mg/dL  High             160-189 mg/dL  Very High        >189 mg/dL   CBC (NO DIFF) - Abnormal; Notable for the following components:    Hemoglobin 12.9 (*)     Hematocrit 36.7 (*)     All other components within normal limits   CBC WITH AUTO DIFFERENTIAL - Abnormal; Notable for the following components:    Neutrophil % 81.5 (*)     Lymphocyte % 9.3 (*)     All other components within normal limits   COMPREHENSIVE METABOLIC PANEL - Abnormal; Notable for the following components:    Creatinine 0.74 (*)     Potassium 3.2 (*)     Total Bilirubin 1.7 (*)     Anion Gap 16.0 (*)     All other components within normal limits    Narrative:     GFR Normal >60  Chronic Kidney Disease <60  Kidney Failure <15     CBC WITH AUTO DIFFERENTIAL - Abnormal; Notable for the following components:    RBC 4.02 (*)     Hemoglobin 12.2 (*)     Hematocrit 34.2 (*)     MCHC 35.8 (*)     Neutrophil % 83.0 (*)     Lymphocyte % 9.6 (*)     All other components within normal limits   POCT GLUCOSE FINGERSTICK - Abnormal; Notable for the  following components:    Glucose 111 (*)     All other components within normal limits   POCT GLUCOSE FINGERSTICK - Abnormal; Notable for the following components:    Glucose 106 (*)     All other components within normal limits            EKG                  I personally viewed and interpreted the patient's EKG/Telemetry data: Sinus rhythm right bundle branch block    ECHOCARDIOGRAM:    Results for orders placed during the hospital encounter of 04/12/22    Adult Transesophageal Echo (BRIAN) W/ Cont if Necessary Per Protocol    Interpretation Summary  Date of study  4/15/2022.    Procedure performed  Transesophageal echocardiogram and Doppler study.  (Color continuous-wave and pulsed wave Doppler)    Procedure  Anesthesia was provided by anesthesiologist with intravenous Diprivan.  BRIAN probe could be passed without difficulty.  Patient tolerated the procedure well.  No complications were noted.    Results  Technically satisfactory study.  Mitral valve is structurally and functionally normal.  Aortic valve is tricuspid agent normal.  Tricuspid valve is normal.  Left atrium is mildly enlarged.  Left atrial appendage is enlarged without clot.  Left ventricle is normal in size and contractility with ejection fraction of 60%.  Right atrium and right ventricle are normal.  Atrial septum is intact without a PFO.  Aorta is normal.  No pericardial effusion is seen.    Impression  No evidence for intracardiac thrombus is present.  Left atrial enlargement.  Left atrial appendage enlargement.  Normal left ventricular size and contractility with ejection fraction of 60%.  No evidence for PFO is present.          STRESS MYOVIEW:    Cardiolite (Tc-99m Sestamibi) stress test    CARDIAC CATHETERIZATION:            OTHER:         Assessment/Plan     Principal Problem:    Suspected cerebrovascular accident (CVA)  Active Problems:    Aphasia    Essential hypertension    Dementia with behavioral disturbance (HCC)    Facial droop     Feeding difficulty      ]]]]]]]]]]]]]]]]]]]]  Impression  ==========  -Recent speech difficulty and left facial droop.     Patient has multiple acute to subacute tiny strokes within left temporal lobe, left basal ganglia, right frontal lobe. Strokes are embolic within both the right and left MCA territory.   - CT head- no acute findings   - MRI brain-  Incomplete examination as described above. Several tiny subacute infarcts within left temporal lobe, left basal ganglia, and right frontal lobe. No large hemorrhagic transformation.  - CTA head and neck- Cerebral vasculature patent without obvious abrupt, acute-appearing large vessel occlusion. No significant ICA stenosis. Focal moderate grade stenosis mid to distal P2 segment of the left PCA.      Echocardiogram 4/14/2022 revealed concentric left ventricle hypertrophy and ejection fraction of 70%.    BRIAN 4/15/2022 revealed  No evidence for intracardiac thrombus is present.  Left atrial enlargement.  Left atrial appendage enlargement.  Normal left ventricular size and contractility with ejection fraction of 60%.  No evidence for PFO is present.     - Right bundle branch block     - History of hypertension.     - History of dementia.     -Denies having any surgical problems in the past.     - Non-smoker     - No known allergies  ============  Plan  ===========  Patient appears to have had probably cardioembolic episode.  Patient has history of hypertension  Patient has right bundle branch block.  Echocardiogram (transthoracic) as above.  BRIAN-as above.  Hypokalemia-3.2.  Patient to have PEG placement  Further plan will depend on patient's progress.  ]]]]]]]]]]]]]]]]]]]]]       Impression      Aramis Vilchis MD  04/16/22  07:37 EDT

## 2022-04-16 NOTE — PROGRESS NOTES
" LOS: 4 days   Patient Care Team:  Suzanna Posada PA as PCP - General (Physician Assistant)      Subjective    \"Cup of coffee\"    Interval History:   4/15/2022 EGD with PEG tube insertion (Dr. Nicholas) successful 20 [place] PEG tube placed.  Focal gastritis involving the fundus.  Biopsies taken.  Suspect atrophic gastritis.  Labs: Potassium 3.2, creatinine 0.74.  LFTs normal except for total bili of 1.7.  Magnesium 1.8.  WBC 7.8, hemoglobin 12.2, platelets 224.    ROS:   Denies   No chest pain, shortness of breath, or cough.         Medication Review:     Current Facility-Administered Medications:   •  aspirin chewable tablet 324 mg, 324 mg, Oral, Daily, 324 mg at 04/16/22 0842 **OR** aspirin suppository 300 mg, 300 mg, Rectal, Daily, Zuri Arroyo APRN  •  atorvastatin (LIPITOR) tablet 80 mg, 80 mg, Oral, Nightly, Cindy Roman APRN  •  Barium Sulfate 60 % cream 1 teaspoon(s), 1 teaspoon(s), Oral, Once in imaging, Jewel Wyatt DO  •  ceFAZolin (ANCEF) in SWFI 2 g/20ml IV PUSH syringe, 2 g, Intravenous, Once, Janett Farrell APRN  •  cyanocobalamin injection 1,000 mcg, 1,000 mcg, Intramuscular, Q28 Days, Zuri Arroyo APRN, 1,000 mcg at 04/14/22 1115  •  dextrose (D5W) 5 % infusion, 50 mL/hr, Intravenous, Continuous, Dallas Mendosa MD, Last Rate: 50 mL/hr at 04/16/22 0411, 50 mL/hr at 04/16/22 0411  •  folic acid (FOLVITE) tablet 1 mg, 1 mg, Oral, Daily, Jewel Wyatt DO, 1 mg at 04/16/22 0843  •  memantine (NAMENDA) tablet 5 mg, 5 mg, Oral, Nightly, Jewel Wyatt I DO  •  metoclopramide (REGLAN) injection 5 mg, 5 mg, Intravenous, TID, Hernesto Nicholas MD, 5 mg at 04/16/22 0842  •  metoprolol tartrate (LOPRESSOR) injection 5 mg, 5 mg, Intravenous, Q6H, Jewel Wyatt DO, 5 mg at 04/16/22 0842  •  niCARdipine (CARDENE) 25mg in 250mL NS infusion, 5-15 mg/hr, Intravenous, PRN, Cindy Roman, DAYO  •  pantoprazole (PROTONIX) injection 40 mg, " 40 mg, Intravenous, BID AC, Bud, Janett D, APRN, 40 mg at 04/16/22 0842  •  polyethylene glycol (MIRALAX) packet 17 g, 17 g, Oral, Daily, Gasper-Egziabher, Jewel I, DO, 17 g at 04/16/22 0842  •  risperiDONE (risperDAL) tablet 0.5 mg, 0.5 mg, Oral, Q12H PRN, Gasper-Egziabher, Jewel I, DO, 0.5 mg at 04/16/22 0842  •  sennosides-docusate (PERICOLACE) 8.6-50 MG per tablet 2 tablet, 2 tablet, Oral, BID, Gasper-Egziabher, Jewel I, DO, 2 tablet at 04/16/22 0842  •  sodium chloride 0.9 % flush 10 mL, 10 mL, Intravenous, PRN, Anuel Abad MD  •  sodium chloride 0.9 % flush 3 mL, 3 mL, Intravenous, Q12H, Bud, Janett D, APRN, 3 mL at 04/16/22 0843  •  sodium chloride 0.9 % flush 3-10 mL, 3-10 mL, Intravenous, PRN, Bud, Janett D, APRN  •  sodium chloride 0.9 % infusion, 75 mL/hr, Intravenous, Continuous, Gasper-Egziabher, Jewel I, DO, Last Rate: 75 mL/hr at 04/15/22 1302, Currently Infusing at 04/15/22 1302      Objective Sitting up in chair.  Tube feeds infusing at 30 cc an hour.  NAD.  No family present    Vital Signs  Temp:  [98.2 °F (36.8 °C)-98.8 °F (37.1 °C)] 98.4 °F (36.9 °C)  Heart Rate:  [62-91] 91  Resp:  [11-22] 12  BP: (124-173)/(69-94) 135/82  Physical Exam:    General Appearance:    Awake and alert, in no acute distress   Head:    Normocephalic, without obvious abnormality   Eyes:          Conjunctivae normal, anicteric sclerae   Ears:    Hearing intact   Throat:   No oral lesions, no thrush, oral mucosa moist   Neck:   No adenopathy, supple, no JVD        Heart:    Regular rhythm and normal rate,  murmur, no gallop, no rub   Abdomen:    PEG tube noted in left upper quadrant.  Bumper loosened to 3.5.  Dressing is clean dry and intact.  Abdominal binder in place.  Normal bowel sounds, soft, non-tender, no rebound or guarding, non-distended, no hepatosplenomegaly   Rectal:     Deferred   Extremities:   No edema, no cyanosis, no redness   Skin:   No bleeding, bruising or rash, no jaundice   Neurologic:   Cranial  nerves 2 - 12 grossly intact, no asterixis, sensation   intact        Results Review:    CBC    Results from last 7 days   Lab Units 04/16/22  0041 04/15/22  0310 04/13/22  0048 04/12/22  1745   WBC 10*3/mm3 7.80 7.00 5.80 5.50   HEMOGLOBIN g/dL 12.2* 13.3 12.9* 13.7   PLATELETS 10*3/mm3 224 245 241 253     CMP   Results from last 7 days   Lab Units 04/16/22  0041 04/15/22  0310 04/13/22  0048 04/12/22  1745   SODIUM mmol/L 140 139 141 139   POTASSIUM mmol/L 3.2* 3.7 3.6 3.9   CHLORIDE mmol/L 102 103 104 102   CO2 mmol/L 22.0 23.0 25.0 25.0   BUN mg/dL 13 17 11 12   CREATININE mg/dL 0.74* 0.80 0.86 0.96   GLUCOSE mg/dL 66 79 93 112*   ALBUMIN g/dL 3.60  --  3.90 4.20   BILIRUBIN mg/dL 1.7*  --  0.9 1.0   ALK PHOS U/L 107  --  104 116   AST (SGOT) U/L 23  --  21 20   ALT (SGPT) U/L 10  --  14 11   MAGNESIUM mg/dL 1.8 1.8  --   --      Cr Clearance Estimated Creatinine Clearance: 92.1 mL/min (A) (by C-G formula based on SCr of 0.74 mg/dL (L)).  Coag   Results from last 7 days   Lab Units 04/15/22  1009 04/12/22  1745   INR  1.05 1.01     HbA1C   Lab Results   Component Value Date    HGBA1C 5.6 04/12/2022     Blood Glucose   Glucose   Date/Time Value Ref Range Status   04/16/2022 0713 96 70 - 105 mg/dL Final     Comment:     Serial Number: [id number]:  [id number]   04/13/2022 1148 106 (H) 70 - 105 mg/dL Final     Comment:     Serial Number: [id number]:  [id number]     Infection   Results from last 7 days   Lab Units 04/13/22  2333   PROCALCITONIN ng/mL 0.03     UA    Results from last 7 days   Lab Units 04/12/22  1805   NITRITE UA  Negative     Radiology(recent) No radiology results for the last day          Assessment/Plan   -Feeding difficulty secondary to acute CVA  -Dysphagia  -Aphasia  -Hypertension  -Dementia     PLAN:  Patient is a 72-year-old male with history of dementia and hypertension who presented on 4/12 with complaints of slurred speech, dysphagia, and left facial droop.  He was diagnosed  with acute thrombotic CVA.  Neurology is following.  GI has been asked to consult secondary to feeding difficulty. The patient has failed a video swallow evaluation this admission.    Successful PEG tube placed yesterday.  Bumper loosened today.  Patient is tolerating tube feeds at 30 cc an hour.  Continue PPI.  Continue Reglan for total 48 hours.  Okay for discharge from GI standpoint.      Ashli Medley, DAYO  04/16/22  11:05 EDT

## 2022-04-16 NOTE — PROGRESS NOTES
[facility] Medicine Services Daily Progress Note    Patient Name: Goran Dunbar  : 1950  MRN: [id number]  Primary Care Physician:  Suzanna Posada PA  Date of admission: 2022      Subjective      Chief Complaint: confusion         History of Present Illness: Goran Dunbar is a 72 y.o. male w/pmh of dementia, and hypertension who presented to [facility] on 2022 complaining of speech difficulties and EMS reports of left sided facial droop.  Patient is unable to provide any past medical history detail.  His family at bedside states that he is seen by physician in [place] and is prescribed amlodipine, metoprolol, potassium and Namenda.  They state he has only been seen at this facility 3 times in those 3 times over the only physicians he has seen in his life.  The daughter at bedside states that he was fine in his normal state of health yesterday all day, and when he went to bed last night.  She states when he awoke he was unable to speak, she did try to feed him but he coughed and choked.  She denies any previous dysphagia aphasia, or stroke in the past.  I will order a chest x-ray to make sure there  aspiration noted, no aspiration pneumonia noted.  WBCs are normal but patient has elevated neutrophils on CBC.  Upon exam patient does have noted right facial weakness, he is unable to communicate verbally, and becomes very frustrated trying to answer questions on paper.  Family at bedside states that this has been ongoing all day when asked any questions he becomes very frustrated and strikes himself in the face.  Patient does report that he fell and hit the right side of his face today, the daughter at bedside who is his caregiver states that she did not see him fall at any time today.  Patient does have erythematous silvana on right side of face.  There are no other obvious injuries noted.  Patient has attempted to get up without assistance, and has  attempted to remove IV lines while family at bedside in the emergency department.  Will order a bedside sitter for safety.     Vital signs upon arrival to the emergency department: /77, HR 83, RR 16, oxygen saturation 98% on room air, patient is afebrile with a T-max of 98.5.  Initial evaluation in the emergency department included:   Initial labs with the following abnormalities noted: Slightly elevated glucose at 112, 79.9% neutrophils.  Urinalysis was completed with no abnormalities noted.  Blood toxicology negative  Chest x-ray completed shows no acute cardiopulmonary abnormalities.  CT scan of the head stroke protocol was completed shows no acute intracranial abnormalities, mild global volume loss and mild probable chronic small vessel ischemic changes, and a perinasal sinus disease was noted.   EKG completed shows sinus rhythm heart rate 79 with RBBB.  NIHSS score 7 at time of arrival.     Patient was not administered any pharmacological agents while in the emergency department.  Patient will be admitted to hospitalist group for further evaluation and treatment of possible CVA, aphasia, and any other acute and or chronic conditions.  Neurology consulted for further stroke evaluation.      4/13/22: Patient confused.  History of dementia.  MRI with several tiny subacute infarcts within left temporal lobe, left basal ganglia and right frontal lobe thus cardiology consulted for BRIAN.  Failed video swallow eval.    4/14/22:  BRIAN tomorrow.  Patient confused.    4/15/22: s/p TTE and intracardiac thrombus and PFO ruled out.  Planned PEG tube today by GI patient with dysarthria but able to communicate with sign language. OOB to chair.  Awaiting rehab bed.    4/16/2022 patient seen and examined in bed no acute distress, he is awake alert responsive.  Trying to write on a piece of paper.  Asking for coffee.  Discussed with RN.  Awaiting placement.  Feeding tube started.    Review of Systems   Unable to perform ROS:  mental status change      Objective      Vitals:   Temp:  [98.2 °F (36.8 °C)-98.8 °F (37.1 °C)] 98.7 °F (37.1 °C)  Heart Rate:  [62-91] 89  Resp:  [11-17] 13  BP: (124-173)/(70-92) 124/87    Physical Exam  HENT:      Head: Normocephalic.      Nose: Nose normal.   Eyes:      General: No scleral icterus.     Extraocular Movements: Extraocular movements intact.      Pupils: Pupils are equal, round, and reactive to light.   Cardiovascular:      Rate and Rhythm: Normal rate and regular rhythm.   Pulmonary:      Effort: Pulmonary effort is normal.      Breath sounds: Normal breath sounds.   Abdominal:      General: Bowel sounds are normal.      Tenderness: There is no abdominal tenderness.   Musculoskeletal:      Cervical back: Normal range of motion.      Comments: Moves all extremities   Lymphadenopathy:      Cervical: No cervical adenopathy.   Skin:     General: Skin is warm.      Findings: No rash.   Neurological:      Mental Status: He is alert. He is confused.      Comments: Dysarthria.   Psychiatric:         Cognition and Memory: Memory is impaired.           Result Review    Result Review:  I have personally reviewed the results from the time of this admission to 4/16/2022 16:00 EDT and agree with these findings:  [x]  Laboratory  []  Microbiology  [x]  Radiology  []  EKG/Telemetry   []  Cardiology/Vascular   []  Pathology  [x]  Old records  []  Other:      Wounds (last 24 hours)     LDA Wound     Row Name               Wound 04/13/22 0421 Bilateral medial coccyx Abrasion    Wound - Properties Group Placement Date: 04/13/22 -KH Placement Time: 0421 -KH Present on Hospital Admission: Y  -KH Side: Bilateral  -KH Orientation: medial  -KH Location: coccyx  -KH Primary Wound Type: Abrasion  -KH      Retired Wound - Properties Group Placement Date: 04/13/22 -KH Placement Time: 0421 -KH Present on Hospital Admission: Y  -KH Side: Bilateral  -KH Orientation: medial  -KH Location: coccyx  -KH Primary Wound Type:  Abrasion  -LAVELLE      Retired Wound - Properties Group Date first assessed: 04/13/22  -LAVELLE Time first assessed: 0421  - Present on Hospital Admission: Y  -LAVELLE Side: Bilateral  - Location: coccyx  -KH Primary Wound Type: Abrasion  -KH            User Key  (r) = Recorded By, (t) = Taken By, (c) = Cosigned By    Initials Name Provider Type    Daija Rosario RN Registered Nurse                  Assessment/Plan      Brief Patient Summary:  73-year-old male with dementia and admission for slurred speech due to ischemic CVA. BRIAN ruled out intracardiac thrombus and PFO.  Planned PEG tube due to severe dysphagia.  Patient awaiting rehab.      aspirin, 324 mg, Oral, Daily   Or  aspirin, 300 mg, Rectal, Daily  atorvastatin, 80 mg, Oral, Nightly  Barium Sulfate, 1 teaspoon(s), Oral, Once in imaging  ceFAZolin, 2 g, Intravenous, Once  cyanocobalamin, 1,000 mcg, Intramuscular, Q28 Days  folic acid, 1 mg, Oral, Daily  memantine, 5 mg, Oral, Nightly  metoclopramide, 5 mg, Intravenous, TID  metoprolol tartrate, 5 mg, Intravenous, Q6H  pantoprazole, 40 mg, Intravenous, BID AC  polyethylene glycol, 17 g, Oral, Daily  senna-docusate sodium, 2 tablet, Oral, BID  sodium chloride, 3 mL, Intravenous, Q12H       dextrose, 50 mL/hr, Last Rate: 50 mL/hr (04/16/22 0411)  sodium chloride, 75 mL/hr, Last Rate: 75 mL/hr (04/15/22 1302)         Active Hospital Problems:  Active Hospital Problems    Diagnosis    • **Suspected cerebrovascular accident (CVA)    • Aphasia    • Essential hypertension    • Dementia with behavioral disturbance (HCC)    • Facial droop    • Feeding difficulty      Added automatically from request for surgery [id number]         Slurred speech due to several tiny subacute infarcts within left temporal lobe, left basal ganglia and right frontal lobe:  -s/p CT head in ED   -s/p CTA head and neck 4/13/2022  -s/p MRI brain 4/13/2022  -Seen by neurology   -Concern for cardioembolic CVA thus cardiology consulted  -s/p BRIAN  4/15/2022--> intracardiac thrombus ruled out.  PFO ruled out.  -s/p video swallow eval 4/13/2022--> dysphagia.  -GI consulted-->for PEG tube 4/15/2022  -Cardiac monitor on discharge    Dysphagia:  -s/p failed video swallow eval 4/13/2022  -GI consulted-->  PEG tube 4/15/2022-Bumper loosened today.  Patient is tolerating tube feeds at 30 cc an hour.  Continue PPI.  Continue Reglan for total 48 hours.  Okay for discharge from GI standpoint.    Vitamin B12 deficiency:  -Continue to replete    Dementia with behavioral disturbance:  -Patient on Namenda  -We will add risperidone  -Patient lives with julio and has had dementia for several years    Hypertension:  -Recent please started on metoprolol and amlodipine by PCP      DVT prophylaxis:  Mechanical DVT prophylaxis orders are present.    CODE STATUS:    Code Status (Patient has no pulse and is not breathing): CPR (Attempt to Resuscitate)  Medical Interventions (Patient has pulse or is breathing): Full Support      Disposition:  I expect patient to be discharged defer.    This patient has been examined wearing appropriate Personal Protective Equipment and discussed with nursing. 04/16/22      Electronically signed by Juan Santoyo MD, 04/16/22, 16:00 EDT.  [facility] Hospitalist Team

## 2022-04-17 ENCOUNTER — APPOINTMENT (OUTPATIENT)
Dept: RESPIRATORY THERAPY | Facility: HOSPITAL | Age: 72
End: 2022-04-17

## 2022-04-17 VITALS
HEIGHT: 70 IN | DIASTOLIC BLOOD PRESSURE: 74 MMHG | BODY MASS INDEX: 22.79 KG/M2 | RESPIRATION RATE: 13 BRPM | WEIGHT: 159.2 LBS | HEART RATE: 84 BPM | SYSTOLIC BLOOD PRESSURE: 98 MMHG | OXYGEN SATURATION: 98 % | TEMPERATURE: 98.5 F

## 2022-04-17 LAB
ANION GAP SERPL CALCULATED.3IONS-SCNC: 9 MMOL/L (ref 5–15)
BASOPHILS # BLD AUTO: 0 10*3/MM3 (ref 0–0.2)
BASOPHILS NFR BLD AUTO: 0.5 % (ref 0–1.5)
BUN SERPL-MCNC: 20 MG/DL (ref 8–23)
BUN/CREAT SERPL: 23.5 (ref 7–25)
CALCIUM SPEC-SCNC: 8.9 MG/DL (ref 8.6–10.5)
CHLORIDE SERPL-SCNC: 106 MMOL/L (ref 98–107)
CO2 SERPL-SCNC: 23 MMOL/L (ref 22–29)
CREAT SERPL-MCNC: 0.85 MG/DL (ref 0.76–1.27)
DEPRECATED RDW RBC AUTO: 40.3 FL (ref 37–54)
EGFRCR SERPLBLD CKD-EPI 2021: 92.3 ML/MIN/1.73
EOSINOPHIL # BLD AUTO: 0.1 10*3/MM3 (ref 0–0.4)
EOSINOPHIL NFR BLD AUTO: 1 % (ref 0.3–6.2)
ERYTHROCYTE [DISTWIDTH] IN BLOOD BY AUTOMATED COUNT: 13.1 % (ref 12.3–15.4)
GLUCOSE BLDC GLUCOMTR-MCNC: 114 MG/DL (ref 70–105)
GLUCOSE BLDC GLUCOMTR-MCNC: 94 MG/DL (ref 70–105)
GLUCOSE SERPL-MCNC: 112 MG/DL (ref 65–99)
HCT VFR BLD AUTO: 35.7 % (ref 37.5–51)
HGB BLD-MCNC: 12.5 G/DL (ref 13–17.7)
LYMPHOCYTES # BLD AUTO: 0.6 10*3/MM3 (ref 0.7–3.1)
LYMPHOCYTES NFR BLD AUTO: 8.4 % (ref 19.6–45.3)
MAGNESIUM SERPL-MCNC: 1.9 MG/DL (ref 1.6–2.4)
MCH RBC QN AUTO: 30.3 PG (ref 26.6–33)
MCHC RBC AUTO-ENTMCNC: 35 G/DL (ref 31.5–35.7)
MCV RBC AUTO: 86.4 FL (ref 79–97)
MONOCYTES # BLD AUTO: 0.6 10*3/MM3 (ref 0.1–0.9)
MONOCYTES NFR BLD AUTO: 7.9 % (ref 5–12)
NEUTROPHILS NFR BLD AUTO: 6.1 10*3/MM3 (ref 1.7–7)
NEUTROPHILS NFR BLD AUTO: 82.2 % (ref 42.7–76)
NRBC BLD AUTO-RTO: 0.1 /100 WBC (ref 0–0.2)
PHOSPHATE SERPL-MCNC: 2.9 MG/DL (ref 2.5–4.5)
PLATELET # BLD AUTO: 199 10*3/MM3 (ref 140–450)
PMV BLD AUTO: 9.1 FL (ref 6–12)
POTASSIUM SERPL-SCNC: 3.2 MMOL/L (ref 3.5–5.2)
POTASSIUM SERPL-SCNC: 3.4 MMOL/L (ref 3.5–5.2)
QT INTERVAL: 391 MS
RBC # BLD AUTO: 4.13 10*6/MM3 (ref 4.14–5.8)
SODIUM SERPL-SCNC: 138 MMOL/L (ref 136–145)
WBC NRBC COR # BLD: 7.4 10*3/MM3 (ref 3.4–10.8)

## 2022-04-17 PROCEDURE — 84132 ASSAY OF SERUM POTASSIUM: CPT | Performed by: INTERNAL MEDICINE

## 2022-04-17 PROCEDURE — 99232 SBSQ HOSP IP/OBS MODERATE 35: CPT | Performed by: INTERNAL MEDICINE

## 2022-04-17 PROCEDURE — 93270 REMOTE 30 DAY ECG REV/REPORT: CPT

## 2022-04-17 PROCEDURE — 25010000002 METOCLOPRAMIDE PER 10 MG: Performed by: INTERNAL MEDICINE

## 2022-04-17 PROCEDURE — 83735 ASSAY OF MAGNESIUM: CPT | Performed by: HOSPITALIST

## 2022-04-17 PROCEDURE — 80048 BASIC METABOLIC PNL TOTAL CA: CPT | Performed by: HOSPITALIST

## 2022-04-17 PROCEDURE — 85025 COMPLETE CBC W/AUTO DIFF WBC: CPT | Performed by: HOSPITALIST

## 2022-04-17 PROCEDURE — 84100 ASSAY OF PHOSPHORUS: CPT

## 2022-04-17 PROCEDURE — 82962 GLUCOSE BLOOD TEST: CPT

## 2022-04-17 PROCEDURE — 99239 HOSP IP/OBS DSCHRG MGMT >30: CPT | Performed by: INTERNAL MEDICINE

## 2022-04-17 RX ORDER — POLYETHYLENE GLYCOL 3350 17 G/17G
17 POWDER, FOR SOLUTION ORAL DAILY
Qty: 30 PACKET | Refills: 0
Start: 2022-04-18

## 2022-04-17 RX ORDER — ATORVASTATIN CALCIUM 80 MG/1
80 TABLET, FILM COATED ORAL NIGHTLY
Qty: 30 TABLET | Refills: 3
Start: 2022-04-17

## 2022-04-17 RX ORDER — AMOXICILLIN 250 MG
2 CAPSULE ORAL 2 TIMES DAILY
Qty: 30 TABLET | Refills: 0 | Status: SHIPPED | OUTPATIENT
Start: 2022-04-17

## 2022-04-17 RX ORDER — POTASSIUM CHLORIDE 20 MEQ/1
20 TABLET, EXTENDED RELEASE ORAL 2 TIMES DAILY
Qty: 30 TABLET | Refills: 0
Start: 2022-04-17

## 2022-04-17 RX ORDER — POTASSIUM CHLORIDE 20 MEQ/1
40 TABLET, EXTENDED RELEASE ORAL ONCE
Status: COMPLETED | OUTPATIENT
Start: 2022-04-17 | End: 2022-04-17

## 2022-04-17 RX ORDER — RISPERIDONE 0.5 MG/1
0.5 TABLET ORAL EVERY 12 HOURS PRN
Qty: 60 TABLET | Refills: 0
Start: 2022-04-17

## 2022-04-17 RX ORDER — ASPIRIN 81 MG/1
324 TABLET, CHEWABLE ORAL DAILY
Qty: 30 TABLET | Refills: 0
Start: 2022-04-18

## 2022-04-17 RX ORDER — FOLIC ACID 1 MG/1
1 TABLET ORAL DAILY
Qty: 30 TABLET | Refills: 1
Start: 2022-04-18

## 2022-04-17 RX ADMIN — PANTOPRAZOLE SODIUM 40 MG: 40 INJECTION, POWDER, FOR SOLUTION INTRAVENOUS at 09:21

## 2022-04-17 RX ADMIN — SODIUM CHLORIDE 75 ML/HR: 9 INJECTION, SOLUTION INTRAVENOUS at 11:13

## 2022-04-17 RX ADMIN — POTASSIUM CHLORIDE 40 MEQ: 1500 TABLET, EXTENDED RELEASE ORAL at 11:17

## 2022-04-17 RX ADMIN — FOLIC ACID 1 MG: 1 TABLET ORAL at 10:03

## 2022-04-17 RX ADMIN — METOCLOPRAMIDE 5 MG: 5 INJECTION, SOLUTION INTRAMUSCULAR; INTRAVENOUS at 09:22

## 2022-04-17 RX ADMIN — ASPIRIN 324 MG: 81 TABLET, CHEWABLE ORAL at 09:21

## 2022-04-17 RX ADMIN — POLYETHYLENE GLYCOL 3350 17 G: 17 POWDER, FOR SOLUTION ORAL at 09:21

## 2022-04-17 RX ADMIN — Medication 3 ML: at 09:24

## 2022-04-17 RX ADMIN — SENNOSIDES AND DOCUSATE SODIUM 2 TABLET: 50; 8.6 TABLET ORAL at 09:21

## 2022-04-17 NOTE — PLAN OF CARE
Goal Outcome Evaluation:           Problem: Fall Injury Risk  Goal: Absence of Fall and Fall-Related Injury  Outcome: Adequate for Care Transition     Problem: Skin Injury Risk Increased  Goal: Skin Health and Integrity  Outcome: Adequate for Care Transition     Problem: Aspiration (Enteral Nutrition)  Goal: Absence of Aspiration Signs and Symptoms  Outcome: Adequate for Care Transition     Problem: Adult Inpatient Plan of Care  Goal: Plan of Care Review  Outcome: Adequate for Care Transition  Goal: Patient-Specific Goal (Individualized)  Outcome: Adequate for Care Transition  Goal: Absence of Hospital-Acquired Illness or Injury  Outcome: Adequate for Care Transition  Goal: Optimal Comfort and Wellbeing  Outcome: Adequate for Care Transition  Goal: Readiness for Transition of Care  Outcome: Adequate for Care Transition

## 2022-04-17 NOTE — CASE MANAGEMENT/SOCIAL WORK
Continued Stay Note  [facility]     Patient Name: Goran Dunbar  MRN: [id number]  Today's Date: 4/17/2022    Admit Date: 4/12/2022     Discharge Plan     Row Name 04/17/22 1440       Plan    Patient/Family in Agreement with Plan yes    Plan Comments RN contacted  regarding plans for dc today. CM contacted [facility] to confirm bed still available. RN notified.              Phone communication or documentation only - no physical contact with patient or family.    Laura Lee RN  Weekend   [place] [id number]  Office: [phone]  Fax: [phone]  [email or web address]

## 2022-04-17 NOTE — PROGRESS NOTES
Referring Provider: Jewel Wyatt,*    Reason for follow-up:  Recent stroke  Cardioembolic source.  Hypertension     Patient Care Team:  Suzanna Posada PA as PCP - General (Physician Assistant)    Subjective .  Doing okay     ROS    Since I have last seen, the patient has been without any chest discomfort ,shortness of breath, palpitations, dizziness or syncope.  Denies having any headache ,abdominal pain ,nausea, vomiting , diarrhea constipation, loss of weight or loss of appetite.  Denies having any excessive bruising ,hematuria or blood in the stool.    Review of all systems negative except as indicated    History  Past Medical History:   Diagnosis Date   • Aphasia 4/12/2022   • Dementia with behavioral disturbance (HCC) 4/12/2022   • Essential hypertension 4/12/2022   • Facial droop 4/12/2022   • Suspected cerebrovascular accident (CVA) 4/12/2022       History reviewed. No pertinent surgical history.    Family History   Family history unknown: Yes       Social History     Tobacco Use   • Smoking status: Never Smoker   • Smokeless tobacco: Never Used   Substance Use Topics   • Alcohol use: Never   • Drug use: Never        Medications Prior to Admission   Medication Sig Dispense Refill Last Dose   • amLODIPine (NORVASC) 10 MG tablet Take 10 mg by mouth Daily.      • memantine (NAMENDA) 5 MG tablet Take 5 mg by mouth Daily.      • metoprolol succinate XL (TOPROL-XL) 50 MG 24 hr tablet Take 50 mg by mouth Daily.      • potassium chloride (K-DUR,KLOR-CON) 20 MEQ CR tablet Take 20 mEq by mouth Daily.          Allergies  Patient has no known allergies.    Scheduled Meds:aspirin, 324 mg, Oral, Daily   Or  aspirin, 300 mg, Rectal, Daily  atorvastatin, 80 mg, Oral, Nightly  Barium Sulfate, 1 teaspoon(s), Oral, Once in imaging  ceFAZolin, 2 g, Intravenous, Once  cyanocobalamin, 1,000 mcg, Intramuscular, Q28 Days  folic acid, 1 mg, Oral, Daily  memantine, 5 mg, Oral, Nightly  metoclopramide, 5 mg, Intravenous,  "TID  metoprolol tartrate, 5 mg, Intravenous, Q6H  pantoprazole, 40 mg, Intravenous, BID AC  polyethylene glycol, 17 g, Oral, Daily  senna-docusate sodium, 2 tablet, Oral, BID  sodium chloride, 3 mL, Intravenous, Q12H      Continuous Infusions:dextrose, 50 mL/hr, Last Rate: 50 mL/hr (04/16/22 0411)  sodium chloride, 75 mL/hr, Last Rate: 75 mL/hr (04/16/22 2219)      PRN Meds:.niCARdipine  •  risperiDONE  •  sodium chloride  •  sodium chloride    Objective     VITAL SIGNS  Vitals:    04/16/22 2230 04/17/22 0223 04/17/22 0300 04/17/22 1028   BP: 125/78 143/80 109/60 129/74   BP Location: Left arm Right arm Right arm Right arm   Patient Position: Lying Lying Lying Sitting   Pulse: 72 85 63 72   Resp: 12 14 17 12   Temp: 98.3 °F (36.8 °C) 97.7 °F (36.5 °C) 97.4 °F (36.3 °C) 98.3 °F (36.8 °C)   TempSrc: Oral Oral Oral Oral   SpO2: 96%  97% 98%   Weight:       Height:           Flowsheet Rows    Flowsheet Row First Filed Value   Admission Height 177.8 cm (70\") Documented at 04/12/2022 1723   Admission Weight 64.7 kg (142 lb 10.2 oz) Documented at 04/12/2022 2331            Intake/Output Summary (Last 24 hours) at 4/17/2022 1050  Last data filed at 4/17/2022 0900  Gross per 24 hour   Intake 10 ml   Output --   Net 10 ml        TELEMETRY: Sinus rhythm    Physical Exam:  The patient is alert, oriented and in no distress.  Vital signs as noted above.  Head and neck revealed no carotid bruits or jugular venous distention.  No thyromegaly or lymphadenopathy is present  Lungs clear.  No wheezing.  Breath sounds are normal bilaterally.  Heart normal first and second heart sounds.  No murmur. No precordial rub is present.  No gallop is present.  Abdomen soft and nontender.  No organomegaly is present.  Extremities with good peripheral pulses without any pedal edema.  Skin warm and dry.  Musculoskeletal system is grossly normal  CNS-dysarthria    Results Review:   I reviewed the patient's new clinical results.  Lab Results (last 24 " hours)     Procedure Component Value Units Date/Time    Basic Metabolic Panel [[id number]]  (Abnormal) Collected: 04/17/22 0728    Specimen: Blood Updated: 04/17/22 0833     Glucose 112 mg/dL      BUN 20 mg/dL      Creatinine 0.85 mg/dL      Sodium 138 mmol/L      Potassium 3.2 mmol/L      Chloride 106 mmol/L      CO2 23.0 mmol/L      Calcium 8.9 mg/dL      BUN/Creatinine Ratio 23.5     Anion Gap 9.0 mmol/L      eGFR 92.3 mL/min/1.73      Comment: National Kidney Foundation and American Society of Nephrology (ASN) Task Force recommended calculation based on the Chronic Kidney Disease Epidemiology Collaboration (CKD-EPI) equation refit without adjustment for race.       Narrative:      GFR Normal >60  Chronic Kidney Disease <60  Kidney Failure <15      POC Glucose Once [[id number]]  (Abnormal) Collected: 04/17/22 0652    Specimen: Blood Updated: 04/17/22 0653     Glucose 114 mg/dL      Comment: Serial Number: [id number]:  [id number]       Phosphorus [[id number]]  (Normal) Collected: 04/17/22 0055    Specimen: Blood Updated: 04/17/22 0237     Phosphorus 2.9 mg/dL     Magnesium [[id number]]  (Normal) Collected: 04/17/22 0055    Specimen: Blood Updated: 04/17/22 0237     Magnesium 1.9 mg/dL     CBC & Differential [[id number]]  (Abnormal) Collected: 04/17/22 0055    Specimen: Blood Updated: 04/17/22 0227    Narrative:      The following orders were created for panel order CBC & Differential.  Procedure                               Abnormality         Status                     ---------                               -----------         ------                     CBC Auto Differential[[id number]]        Abnormal            Final result                 Please view results for these tests on the individual orders.    CBC Auto Differential [[id number]]  (Abnormal) Collected: 04/17/22 0055    Specimen: Blood Updated: 04/17/22 0227     WBC 7.40 10*3/mm3      RBC 4.13 10*6/mm3      Hemoglobin 12.5 g/dL      Hematocrit 35.7 %       MCV 86.4 fL      MCH 30.3 pg      MCHC 35.0 g/dL      RDW 13.1 %      RDW-SD 40.3 fl      MPV 9.1 fL      Platelets 199 10*3/mm3      Neutrophil % 82.2 %      Lymphocyte % 8.4 %      Monocyte % 7.9 %      Eosinophil % 1.0 %      Basophil % 0.5 %      Neutrophils, Absolute 6.10 10*3/mm3      Lymphocytes, Absolute 0.60 10*3/mm3      Monocytes, Absolute 0.60 10*3/mm3      Eosinophils, Absolute 0.10 10*3/mm3      Basophils, Absolute 0.00 10*3/mm3      nRBC 0.1 /100 WBC     POC Glucose Once [[id number]]  (Normal) Collected: 04/17/22 0028    Specimen: Blood Updated: 04/17/22 0029     Glucose 94 mg/dL      Comment: Serial Number: [id number]:  [id number]             Imaging Results (Last 24 Hours)     ** No results found for the last 24 hours. **      LAB RESULTS (LAST 7 DAYS)    CBC  Results from last 7 days   Lab Units 04/17/22  0055 04/16/22  0041 04/15/22  0310 04/13/22  0048 04/12/22  1745   WBC 10*3/mm3 7.40 7.80 7.00 5.80 5.50   RBC 10*6/mm3 4.13* 4.02* 4.39 4.25 4.43   HEMOGLOBIN g/dL 12.5* 12.2* 13.3 12.9* 13.7   HEMATOCRIT % 35.7* 34.2* 37.5 36.7* 38.5   MCV fL 86.4 85.1 85.5 86.4 86.8   PLATELETS 10*3/mm3 199 224 245 241 253       BMP  Results from last 7 days   Lab Units 04/17/22  0728 04/17/22  0055 04/16/22  0041 04/15/22  0310 04/13/22  0048 04/12/22  1745   SODIUM mmol/L 138  --  140 139 141 139   POTASSIUM mmol/L 3.2*  --  3.2* 3.7 3.6 3.9   CHLORIDE mmol/L 106  --  102 103 104 102   CO2 mmol/L 23.0  --  22.0 23.0 25.0 25.0   BUN mg/dL 20  --  13 17 11 12   CREATININE mg/dL 0.85  --  0.74* 0.80 0.86 0.96   GLUCOSE mg/dL 112*  --  66 79 93 112*   MAGNESIUM mg/dL  --  1.9 1.8 1.8  --   --    PHOSPHORUS mg/dL  --  2.9  --   --   --   --        CMP   Results from last 7 days   Lab Units 04/17/22  0728 04/16/22  0041 04/15/22  0310 04/13/22  0048 04/12/22  1745   SODIUM mmol/L 138 140 139 141 139   POTASSIUM mmol/L 3.2* 3.2* 3.7 3.6 3.9   CHLORIDE mmol/L 106 102 103 104 102   CO2 mmol/L 23.0 22.0 23.0  25.0 25.0   BUN mg/dL 20 13 17 11 12   CREATININE mg/dL 0.85 0.74* 0.80 0.86 0.96   GLUCOSE mg/dL 112* 66 79 93 112*   ALBUMIN g/dL  --  3.60  --  3.90 4.20   BILIRUBIN mg/dL  --  1.7*  --  0.9 1.0   ALK PHOS U/L  --  107  --  104 116   AST (SGOT) U/L  --  23  --  21 20   ALT (SGPT) U/L  --  10  --  14 11         BNP        TROPONIN        CoAg  Results from last 7 days   Lab Units 04/15/22  1009 04/12/22  1745   INR  1.05 1.01       Creatinine Clearance  Estimated Creatinine Clearance: 80.2 mL/min (by C-G formula based on SCr of 0.85 mg/dL).    ABG        Radiology  No radiology results for the last day    Abnormal  Abnormal Labs Reviewed   COMPREHENSIVE METABOLIC PANEL - Abnormal; Notable for the following components:       Result Value    Glucose 112 (*)     All other components within normal limits    Narrative:     GFR Normal >60  Chronic Kidney Disease <60  Kidney Failure <15     URINALYSIS W/ CULTURE IF INDICATED - Abnormal; Notable for the following components:    Color, UA Dark Yellow (*)     All other components within normal limits    Narrative:     Urine microscopic not indicated.   CBC WITH AUTO DIFFERENTIAL - Abnormal; Notable for the following components:    Neutrophil % 79.9 (*)     Lymphocyte % 11.1 (*)     Lymphocytes, Absolute 0.60 (*)     All other components within normal limits   LIPID PANEL - Abnormal; Notable for the following components:    HDL Cholesterol 36 (*)     All other components within normal limits    Narrative:     Cholesterol Reference Ranges  (U.S. Department of Health and Human Services ATP III Classifications)    Desirable          <200 mg/dL  Borderline High    200-239 mg/dL  High Risk          >240 mg/dL      Triglyceride Reference Ranges  (U.S. Department of Health and Human Services ATP III Classifications)    Normal           <150 mg/dL  Borderline High  150-199 mg/dL  High             200-499 mg/dL  Very High        >500 mg/dL    HDL Reference Ranges  (U.S. Department of  Health and Human Services ATP III Classifications)    Low     <40 mg/dl (major risk factor for CHD)  High    >60 mg/dl ('negative' risk factor for CHD)        LDL Reference Ranges  (U.S. Department of Health and Human Services ATP III Classifications)    Optimal          <100 mg/dL  Near Optimal     100-129 mg/dL  Borderline High  130-159 mg/dL  High             160-189 mg/dL  Very High        >189 mg/dL   CBC (NO DIFF) - Abnormal; Notable for the following components:    Hemoglobin 12.9 (*)     Hematocrit 36.7 (*)     All other components within normal limits   CBC WITH AUTO DIFFERENTIAL - Abnormal; Notable for the following components:    Neutrophil % 81.5 (*)     Lymphocyte % 9.3 (*)     All other components within normal limits   COMPREHENSIVE METABOLIC PANEL - Abnormal; Notable for the following components:    Creatinine 0.74 (*)     Potassium 3.2 (*)     Total Bilirubin 1.7 (*)     Anion Gap 16.0 (*)     All other components within normal limits    Narrative:     GFR Normal >60  Chronic Kidney Disease <60  Kidney Failure <15     CBC WITH AUTO DIFFERENTIAL - Abnormal; Notable for the following components:    RBC 4.02 (*)     Hemoglobin 12.2 (*)     Hematocrit 34.2 (*)     MCHC 35.8 (*)     Neutrophil % 83.0 (*)     Lymphocyte % 9.6 (*)     All other components within normal limits   BASIC METABOLIC PANEL - Abnormal; Notable for the following components:    Glucose 112 (*)     Potassium 3.2 (*)     All other components within normal limits    Narrative:     GFR Normal >60  Chronic Kidney Disease <60  Kidney Failure <15     CBC WITH AUTO DIFFERENTIAL - Abnormal; Notable for the following components:    RBC 4.13 (*)     Hemoglobin 12.5 (*)     Hematocrit 35.7 (*)     Neutrophil % 82.2 (*)     Lymphocyte % 8.4 (*)     Lymphocytes, Absolute 0.60 (*)     All other components within normal limits   POCT GLUCOSE FINGERSTICK - Abnormal; Notable for the following components:    Glucose 111 (*)     All other components  within normal limits   POCT GLUCOSE FINGERSTICK - Abnormal; Notable for the following components:    Glucose 106 (*)     All other components within normal limits   POCT GLUCOSE FINGERSTICK - Abnormal; Notable for the following components:    Glucose 114 (*)     All other components within normal limits            EKG                  I personally viewed and interpreted the patient's EKG/Telemetry data: Sinus rhythm right bundle branch block    ECHOCARDIOGRAM:    Results for orders placed during the hospital encounter of 04/12/22    Adult Transesophageal Echo (BRIAN) W/ Cont if Necessary Per Protocol    Interpretation Summary  Date of study  4/15/2022.    Procedure performed  Transesophageal echocardiogram and Doppler study.  (Color continuous-wave and pulsed wave Doppler)    Procedure  Anesthesia was provided by anesthesiologist with intravenous Diprivan.  BRIAN probe could be passed without difficulty.  Patient tolerated the procedure well.  No complications were noted.    Results  Technically satisfactory study.  Mitral valve is structurally and functionally normal.  Aortic valve is tricuspid agent normal.  Tricuspid valve is normal.  Left atrium is mildly enlarged.  Left atrial appendage is enlarged without clot.  Left ventricle is normal in size and contractility with ejection fraction of 60%.  Right atrium and right ventricle are normal.  Atrial septum is intact without a PFO.  Aorta is normal.  No pericardial effusion is seen.    Impression  No evidence for intracardiac thrombus is present.  Left atrial enlargement.  Left atrial appendage enlargement.  Normal left ventricular size and contractility with ejection fraction of 60%.  No evidence for PFO is present.          STRESS MYOVIEW:    Cardiolite (Tc-99m Sestamibi) stress test    CARDIAC CATHETERIZATION:            OTHER:         Assessment/Plan     Principal Problem:    Suspected cerebrovascular accident (CVA)  Active Problems:    Aphasia    Essential  hypertension    Dementia with behavioral disturbance (HCC)    Facial droop    Feeding difficulty      ]]]]]]]]]]]]]]]]]]]]  Impression  ==========  -Recent speech difficulty and left facial droop.     Patient has multiple acute to subacute tiny strokes within left temporal lobe, left basal ganglia, right frontal lobe. Strokes are embolic within both the right and left MCA territory.   - CT head- no acute findings   - MRI brain-  Incomplete examination as described above. Several tiny subacute infarcts within left temporal lobe, left basal ganglia, and right frontal lobe. No large hemorrhagic transformation.  - CTA head and neck- Cerebral vasculature patent without obvious abrupt, acute-appearing large vessel occlusion. No significant ICA stenosis. Focal moderate grade stenosis mid to distal P2 segment of the left PCA.      Echocardiogram 4/14/2022 revealed concentric left ventricle hypertrophy and ejection fraction of 70%.    BRIAN 4/15/2022 revealed  No evidence for intracardiac thrombus is present.  Left atrial enlargement.  Left atrial appendage enlargement.  Normal left ventricular size and contractility with ejection fraction of 60%.  No evidence for PFO is present.     - Right bundle branch block     - History of hypertension.     - History of dementia.     -Denies having any surgical problems in the past.     - Non-smoker     - No known allergies  ============  Plan  ===========  Patient appears to have had probably cardioembolic episode.  Patient has history of hypertension  Patient has right bundle branch block.  Echocardiogram (transthoracic) as above.  BRIAN-as above.  Hypokalemia-3.2-new problem.  Supplements.  Patient to have PEG placement  Further plan will depend on patient's progress.  ]]]]]]]]]]]]]]]]]]]]]             Aramis Vilchis MD  04/17/22  10:50 EDT

## 2022-04-17 NOTE — PLAN OF CARE
Problem: Adult Inpatient Plan of Care  Goal: Plan of Care Review  Outcome: Ongoing, Progressing  Flowsheets (Taken 4/17/2022 0258)  Plan of Care Reviewed With: patient  Goal: Patient-Specific Goal (Individualized)  Outcome: Ongoing, Progressing  Goal: Absence of Hospital-Acquired Illness or Injury  Outcome: Ongoing, Progressing  Intervention: Identify and Manage Fall Risk  Recent Flowsheet Documentation  Taken 4/17/2022 0200 by Taina López RN  Safety Promotion/Fall Prevention:   assistive device/personal items within reach   clutter free environment maintained   fall prevention program maintained   lighting adjusted   nonskid shoes/slippers when out of bed   room organization consistent   safety round/check completed  Taken 4/17/2022 0000 by Taina López RN  Safety Promotion/Fall Prevention:   assistive device/personal items within reach   clutter free environment maintained   fall prevention program maintained   lighting adjusted   nonskid shoes/slippers when out of bed   room organization consistent   safety round/check completed  Taken 4/16/2022 2200 by Taina López RN  Safety Promotion/Fall Prevention:   assistive device/personal items within reach   clutter free environment maintained   fall prevention program maintained   lighting adjusted   nonskid shoes/slippers when out of bed   room organization consistent   safety round/check completed  Taken 4/16/2022 2030 by Taina López RN  Safety Promotion/Fall Prevention:   assistive device/personal items within reach   clutter free environment maintained   fall prevention program maintained   lighting adjusted   nonskid shoes/slippers when out of bed   room organization consistent   safety round/check completed  Intervention: Prevent Skin Injury  Recent Flowsheet Documentation  Taken 4/16/2022 2030 by Taina Lóepz RN  Skin Protection:   adhesive use limited   incontinence pads utilized   tubing/devices free from skin contact  Intervention:  Prevent and Manage VTE (Venous Thromboembolism) Risk  Recent Flowsheet Documentation  Taken 4/16/2022 2030 by Taina López RN  VTE Prevention/Management: sequential compression devices on  Intervention: Prevent Infection  Recent Flowsheet Documentation  Taken 4/17/2022 0200 by Taina López RN  Infection Prevention:   single patient room provided   personal protective equipment utilized   rest/sleep promoted   hand hygiene promoted  Taken 4/17/2022 0000 by Taina López RN  Infection Prevention:   single patient room provided   personal protective equipment utilized   rest/sleep promoted   hand hygiene promoted  Taken 4/16/2022 2200 by Taina López RN  Infection Prevention:   single patient room provided   rest/sleep promoted   personal protective equipment utilized   hand hygiene promoted  Taken 4/16/2022 2030 by Taina López RN  Infection Prevention:   single patient room provided   personal protective equipment utilized   hand hygiene promoted  Goal: Optimal Comfort and Wellbeing  Outcome: Ongoing, Progressing  Intervention: Provide Person-Centered Care  Recent Flowsheet Documentation  Taken 4/16/2022 2030 by Taina López RN  Trust Relationship/Rapport:   care explained   choices provided   questions answered   questions encouraged   thoughts/feelings acknowledged  Goal: Readiness for Transition of Care  Outcome: Ongoing, Progressing     Problem: Fall Injury Risk  Goal: Absence of Fall and Fall-Related Injury  Outcome: Ongoing, Progressing  Intervention: Identify and Manage Contributors  Recent Flowsheet Documentation  Taken 4/16/2022 2030 by Taina López RN  Medication Review/Management: medications reviewed  Intervention: Promote Injury-Free Environment  Recent Flowsheet Documentation  Taken 4/17/2022 0200 by Taina López RN  Safety Promotion/Fall Prevention:   assistive device/personal items within reach   clutter free environment maintained   fall prevention program maintained    lighting adjusted   nonskid shoes/slippers when out of bed   room organization consistent   safety round/check completed  Taken 4/17/2022 0000 by Taina López RN  Safety Promotion/Fall Prevention:   assistive device/personal items within reach   clutter free environment maintained   fall prevention program maintained   lighting adjusted   nonskid shoes/slippers when out of bed   room organization consistent   safety round/check completed  Taken 4/16/2022 2200 by Taina López RN  Safety Promotion/Fall Prevention:   assistive device/personal items within reach   clutter free environment maintained   fall prevention program maintained   lighting adjusted   nonskid shoes/slippers when out of bed   room organization consistent   safety round/check completed  Taken 4/16/2022 2030 by Taina López RN  Safety Promotion/Fall Prevention:   assistive device/personal items within reach   clutter free environment maintained   fall prevention program maintained   lighting adjusted   nonskid shoes/slippers when out of bed   room organization consistent   safety round/check completed     Problem: Skin Injury Risk Increased  Goal: Skin Health and Integrity  Outcome: Ongoing, Progressing  Intervention: Optimize Skin Protection  Recent Flowsheet Documentation  Taken 4/16/2022 2030 by Taina López RN  Pressure Reduction Techniques:   frequent weight shift encouraged   weight shift assistance provided  Pressure Reduction Devices: pressure-redistributing mattress utilized  Skin Protection:   adhesive use limited   incontinence pads utilized   tubing/devices free from skin contact     Problem: Aspiration (Enteral Nutrition)  Goal: Absence of Aspiration Signs and Symptoms  Outcome: Ongoing, Progressing     Problem: Device-Related Complication Risk (Enteral Nutrition)  Goal: Safe, Effective Therapy Delivery  Outcome: Ongoing, Progressing     Problem: Feeding Intolerance (Enteral Nutrition)  Goal: Feeding Tolerance  Outcome:  Ongoing, Progressing   Goal Outcome Evaluation:  Plan of Care Reviewed With: patient   Pt still has very garbled speech but communicates by writing on paper. Very pleasant tonight. Tube feeding still running per orders. Fall prevention maintained and call light in reach.

## 2022-04-17 NOTE — DISCHARGE SUMMARY
[facility] Hospital Medicine Services  DISCHARGE SUMMARY    Patient Name: Goran Dunbar  : 1950  MRN: [id number]    Date of Admission: 2022  Discharge Diagnosis: CVA  Date of Discharge:  22  Primary Care Physician: Suzanna Posada PA      Presenting Problem:   Altered mental status [R41.82]  Altered mental status, unspecified altered mental status type [R41.82]    Active and Resolved Hospital Problems:  Active Hospital Problems    Diagnosis POA   • **Suspected cerebrovascular accident (CVA) [R09.89] Yes   • Aphasia [R47.01] Yes   • Essential hypertension [I10] Yes   • Dementia with behavioral disturbance (HCC) [F03.91] Yes   • Facial droop [R29.810] Yes   • Feeding difficulty [R63.30] Unknown      Resolved Hospital Problems    Diagnosis POA   • Altered mental status [R41.82] Yes     Slurred speech due to several tiny subacute infarcts within left temporal lobe, left basal ganglia and right frontal lobe:  -s/p CT head in ED   -s/p CTA head and neck 2022  -s/p MRI brain 2022  -Seen by neurology   -Concern for cardioembolic CVA thus cardiology consulted  -s/p BRIAN 4/15/2022--> intracardiac thrombus ruled out.  PFO ruled out.  -s/p video swallow eval 2022--> dysphagia.  -GI consulted-->for PEG tube 4/15/2022     Dysphagia:  -s/p failed video swallow eval 2022  -GI consulted-->  PEG tube 4/15/2022-Bumper loosened today.  Patient is tolerating tube feeds at 30 cc an hour.  Continue PPI.  Continue Reglan for total 48 hours.     Vitamin B12 deficiency:  -Continue to replete     Dementia with behavioral disturbance:  -Patient on Namenda  -add risperidone  -Patient lives with daugher and has had dementia for several years     Hypertension:  -Recent please started on metoprolol and amlodipine by PCP     Hospital Course     Hospital Course:  Goran Dunbar is a 72 y.o. male w/pmh of dementia, and hypertension who presented to [facility] on  4/12/2022 complaining of speech difficulties and EMS reports of left sided facial droop.  Patient is unable to provide any past medical history detail.  His family at bedside states that he is seen by physician in [place] and is prescribed amlodipine, metoprolol, potassium and Namenda.  They state he has only been seen at this facility 3 times in those 3 times over the only physicians he has seen in his life.  The daughter at bedside states that he was fine in his normal state of health yesterday all day, and when he went to bed last night.  She states when he awoke he was unable to speak, she did try to feed him but he coughed and choked.  She denies any previous dysphagia aphasia, or stroke in the past.  I will order a chest x-ray to make sure there  aspiration noted, no aspiration pneumonia noted.  WBCs are normal but patient has elevated neutrophils on CBC.  Upon exam patient does have noted right facial weakness, he is unable to communicate verbally, and becomes very frustrated trying to answer questions on paper.  Family at bedside states that this has been ongoing all day when asked any questions he becomes very frustrated and strikes himself in the face.  Patient does report that he fell and hit the right side of his face today, the daughter at bedside who is his caregiver states that she did not see him fall at any time today.  Patient does have erythematous silvana on right side of face.  There are no other obvious injuries noted.  Patient has attempted to get up without assistance, and has attempted to remove IV lines while family at bedside in the emergency department.  Will order a bedside sitter for safety.     Vital signs upon arrival to the emergency department: /77, HR 83, RR 16, oxygen saturation 98% on room air, patient is afebrile with a T-max of 98.5.  Initial evaluation in the emergency department included:   Initial labs with the following abnormalities noted: Slightly elevated glucose at  112, 79.9% neutrophils.  Urinalysis was completed with no abnormalities noted.  Blood toxicology negative  Chest x-ray completed shows no acute cardiopulmonary abnormalities.  CT scan of the head stroke protocol was completed shows no acute intracranial abnormalities, mild global volume loss and mild probable chronic small vessel ischemic changes, and a perinasal sinus disease was noted.   EKG completed shows sinus rhythm heart rate 79 with RBBB.  NIHSS score 7 at time of arrival.     Patient was not administered any pharmacological agents while in the emergency department.  Patient will be admitted to hospitalist group for further evaluation and treatment of possible CVA, aphasia, and any other acute and or chronic conditions.  Neurology consulted for further stroke evaluation.        4/13/22: Patient confused.  History of dementia.  MRI with several tiny subacute infarcts within left temporal lobe, left basal ganglia and right frontal lobe thus cardiology consulted for BRIAN.  Failed video swallow eval.     4/14/22:  BRIAN tomorrow.  Patient confused.     4/15/22: s/p TTE and intracardiac thrombus and PFO ruled out.  Planned PEG tube today by GI patient with dysarthria but able to communicate with sign language. OOB to chair.  Awaiting rehab bed.     4/16/2022 patient seen and examined in bed no acute distress, he is awake alert responsive.  Trying to write on a piece of paper.  Asking for coffee.  Discussed with RN.  Awaiting placement.  Feeding tube started.     4/17/2022 doing better today, will dc to nursing home, condition on dc stable.    DISCHARGE Follow Up Recommendations for labs and diagnostics: monitor BMP closely, KCL increased      Reasons For Change In Medications and Indications for New Medications:     START taking:   aspirin   Start taking on: April 18, 2022     atorvastatin (LIPITOR)      cyanocobalamin ([employer] Vitamin B-12)      folic acid (FOLVITE)   Start taking on: April 18, 2022     polyethylene  glycol (MIRALAX)   Start taking on: April 18, 2022     risperiDONE (risperDAL)      sennosides-docusate (PERICOLACE)        CHANGE how you take:   potassium chloride (K-DUR,KLOR-CON) -- when to take this, additional instructions      STOP taking:   amLODIPine 10 MG tablet (NORVASC)      metoprolol succinate XL 50 MG 24 hr tablet (TOPROL-XL)     Day of Discharge     Vital Signs:  Temp:  [97.4 °F (36.3 °C)-98.6 °F (37 °C)] 98.5 °F (36.9 °C)  Heart Rate:  [63-85] 84  Resp:  [9-17] 13  BP: ()/(60-80) 98/74      Physical Exam  Vitals and nursing note reviewed.   Constitutional:       General: He is not in acute distress.     Appearance: He is well-developed. He is not ill-appearing, toxic-appearing or diaphoretic.   HENT:      Head: Normocephalic and atraumatic.      Right Ear: Ear canal normal.      Nose: Nose normal. No congestion or rhinorrhea.      Mouth/Throat:      Mouth: Mucous membranes are moist.      Pharynx: No oropharyngeal exudate.   Eyes:      General:         Right eye: No discharge.      Extraocular Movements: Extraocular movements intact.      Pupils: Pupils are equal, round, and reactive to light.   Neck:      Thyroid: No thyromegaly.      Vascular: No JVD.      Trachea: No tracheal deviation.   Cardiovascular:      Rate and Rhythm: Normal rate and regular rhythm.      Pulses: Normal pulses.      Heart sounds: Normal heart sounds. No murmur heard.    No friction rub.   Pulmonary:      Effort: Pulmonary effort is normal. No respiratory distress.      Breath sounds: Normal breath sounds.   Abdominal:      General: Bowel sounds are normal. There is no distension.      Palpations: Abdomen is soft.   Musculoskeletal:         General: No swelling, tenderness, deformity or signs of injury. Normal range of motion.      Cervical back: Normal range of motion and neck supple. No rigidity. No muscular tenderness.      Right lower leg: No edema.      Left lower leg: No edema.   Skin:     General: Skin is warm  and dry.      Coloration: Skin is pale. Skin is not jaundiced.      Findings: No bruising, erythema or rash.   Neurological:      General: No focal deficit present.      Mental Status: He is alert. Mental status is at baseline.      Cranial Nerves: No cranial nerve deficit.      Sensory: No sensory deficit.      Motor: Weakness present. No abnormal muscle tone.      Coordination: Coordination normal.      =  Pertinent  and/or Most Recent Results     LAB RESULTS:      Lab 04/17/22  0055 04/16/22  0041 04/15/22  1009 04/15/22  0310 04/13/22  2333 04/13/22 0048 04/12/22  1745   WBC 7.40 7.80  --  7.00  --  5.80 5.50   HEMOGLOBIN 12.5* 12.2*  --  13.3  --  12.9* 13.7   HEMATOCRIT 35.7* 34.2*  --  37.5  --  36.7* 38.5   PLATELETS 199 224  --  245  --  241 253   NEUTROS ABS 6.10 6.50  --  5.70  --   --  4.40   LYMPHS ABS 0.60* 0.70  --  0.70  --   --  0.60*   MONOS ABS 0.60 0.50  --  0.50  --   --  0.40   EOS ABS 0.10 0.00  --  0.10  --   --  0.10   MCV 86.4 85.1  --  85.5  --  86.4 86.8   CRP  --   --   --   --  0.37  --   --    PROCALCITONIN  --   --   --   --  0.03  --   --    PROTIME  --   --  10.8  --   --   --  10.4         Lab 04/17/22  0728 04/17/22  0055 04/16/22  0041 04/15/22  0310 04/13/22  0048 04/12/22  2100 04/12/22  1745   SODIUM 138  --  140 139 141  --  139   POTASSIUM 3.2*  --  3.2* 3.7 3.6  --  3.9   CHLORIDE 106  --  102 103 104  --  102   CO2 23.0  --  22.0 23.0 25.0  --  25.0   ANION GAP 9.0  --  16.0* 13.0 12.0  --  12.0   BUN 20  --  13 17 11  --  12   CREATININE 0.85  --  0.74* 0.80 0.86  --  0.96   EGFR 92.3  --  96.3 94.0 92.0  --  84.0   GLUCOSE 112*  --  66 79 93  --  112*   CALCIUM 8.9  --  9.0 9.5 9.0  --  9.2   MAGNESIUM  --  1.9 1.8 1.8  --   --   --    PHOSPHORUS  --  2.9  --   --   --   --   --    HEMOGLOBIN A1C  --   --   --   --   --  5.6  --    TSH  --   --   --   --   --   --  1.880         Lab 04/16/22  0041 04/13/22  0048 04/12/22  1745   TOTAL PROTEIN 6.8 6.9 7.5   ALBUMIN 3.60  3.90 4.20   GLOBULIN 3.2 3.0 3.3   ALT (SGPT) 10 14 11   AST (SGOT) 23 21 20   BILIRUBIN 1.7* 0.9 1.0   ALK PHOS 107 104 116         Lab 04/15/22  1009 04/12/22  1745   PROTIME 10.8 10.4   INR 1.05 1.01         Lab 04/12/22  1745   CHOLESTEROL 123   LDL CHOL 68   HDL CHOL 36*   TRIGLYCERIDES 99         Lab 04/12/22  1745   VITAMIN B 12 235   ABO TYPING B   RH TYPING Positive   ANTIBODY SCREEN Negative         Brief Urine Lab Results  (Last result in the past 365 days)      Color   Clarity   Blood   Leuk Est   Nitrite   Protein   CREAT   Urine HCG        04/12/22 1805 Dark Yellow   Clear   Negative   Negative   Negative   Negative               Microbiology Results (last 10 days)     Procedure Component Value - Date/Time    COVID PRE-OP / PRE-PROCEDURE SCREENING ORDER (NO ISOLATION) - Swab, Nasopharynx [[id number]]  (Normal) Collected: 04/12/22 2256    Lab Status: Final result Specimen: Swab from Nasopharynx Updated: 04/12/22 2334    Narrative:      The following orders were created for panel order COVID PRE-OP / PRE-PROCEDURE SCREENING ORDER (NO ISOLATION) - Swab, Nasopharynx.  Procedure                               Abnormality         Status                     ---------                               -----------         ------                     COVID-19,CEPHEID/GINA,CO...[[id number]]  Normal              Final result                 Please view results for these tests on the individual orders.    COVID-19,CEPHEID/GINA,COR/ANITA/PAD/ROWDY IN-HOUSE(OR EMERGENT/ADD-ON),NP SWAB IN TRANSPORT MEDIA 3-4 HR TAT, RT-PCR - Swab, Nasopharynx [[id number]]  (Normal) Collected: 04/12/22 2256    Lab Status: Final result Specimen: Swab from Nasopharynx Updated: 04/12/22 2334     COVID19 Not Detected    Narrative:      Fact sheet for providers: https://www.fda.gov/media/470871/download     Fact sheet for patients: https://www.fda.gov/media/799891/download  Fact sheet for providers: https://www.fda.gov/media/848221/download    Fact  sheet for patients: https://www.fda.gov/media/567152/download    Test performed by PCR.          MRI Brain Without Contrast    Result Date: 4/13/2022  Impression: 1.Incomplete examination as described above. 2.Several tiny subacute infarcts within left temporal lobe, left basal ganglia, and right frontal lobe. No large hemorrhagic transformation.  Electronically Signed By-Dallas Santiago MD On:4/13/2022 1:19 PM This report was finalized on [id number] by  Dallas Santiago MD.    FL Video Swallow With Speech Single Contrast    Result Date: 4/13/2022  Impression: 1.Silent aspiration with thin liquid. 2.Penetration with nectar. 3.Please refer to speech pathologist report for further details.  Electronically Signed By-Dallas Santiago MD On:4/13/2022 11:47 AM This report was finalized on [id number] by  Dallas Santiaog MD.    XR Chest 1 View    Result Date: 4/13/2022  Impression: No acute cardiopulmonary process identified.  Electronically Signed By-Dallas Santiago MD On:4/13/2022 8:20 AM This report was finalized on [id number] by  Dallas Santiago MD.    XR Chest 1 View    Result Date: 4/12/2022  Impression: No acute cardiopulmonary abnormality is identified.  Electronically Signed By-Parul Bush MD On:4/12/2022 6:30 PM This report was finalized on [id number] by  Parul Bush MD.    CT Angiogram Carotids    Result Date: 4/13/2022  Impression:  1. Cerebral vasculature appears patent without obvious abrupt, acute-appearing large vessel occlusion demonstrated. 2. No angiographic significant proximal internal carotid stenosis identified on either side. 3. Patent bilateral vertebral arteries left side dominant. Right vertebral artery appears to terminate in PICA. 4. Focal moderate grade stenosis, mid to distal P2 segment of the left posterior cerebral artery, likely atheromatous. 5. 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 6. Paranasal sinus disease. Recommend clinical  correlation. 7. Additional findings, both vascular and nonvascular, as described.  Electronically Signed By-Tran Easley MD On:4/13/2022 12:50 PM This report was finalized on [id number] by  Tran Easley MD.    CT Angiogram Head    Result Date: 4/13/2022  Impression:  1. Cerebral vasculature appears patent without obvious abrupt, acute-appearing large vessel occlusion demonstrated. 2. No angiographic significant proximal internal carotid stenosis identified on either side. 3. Patent bilateral vertebral arteries left side dominant. Right vertebral artery appears to terminate in PICA. 4. Focal moderate grade stenosis, mid to distal P2 segment of the left posterior cerebral artery, likely atheromatous. 5. 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 6. Paranasal sinus disease. Recommend clinical correlation. 7. Additional findings, both vascular and nonvascular, as described.  Electronically Signed By-Tran Easley MD On:4/13/2022 12:50 PM This report was finalized on [id number] by  Tran Easley MD.    CT Head Without Contrast Stroke Protocol    Result Date: 4/12/2022  Impression: 1.No acute intracranial abnormality is identified on head CT. 2.Mild global volume loss and mild probable chronic small vessel ischemic change. 3.Paranasal sinus disease.  Electronically Signed By-Parul Bush MD On:4/12/2022 6:34 PM This report was finalized on [id number] by  Parul Bush MD.              Results for orders placed during the hospital encounter of 04/12/22    Adult Transesophageal Echo (BRIAN) W/ Cont if Necessary Per Protocol    Interpretation Summary  Date of study  4/15/2022.    Procedure performed  Transesophageal echocardiogram and Doppler study.  (Color continuous-wave and pulsed wave Doppler)    Procedure  Anesthesia was provided by anesthesiologist with intravenous Diprivan.  BRIAN probe could be passed without difficulty.  Patient tolerated the procedure well.  No  complications were noted.    Results  Technically satisfactory study.  Mitral valve is structurally and functionally normal.  Aortic valve is tricuspid agent normal.  Tricuspid valve is normal.  Left atrium is mildly enlarged.  Left atrial appendage is enlarged without clot.  Left ventricle is normal in size and contractility with ejection fraction of 60%.  Right atrium and right ventricle are normal.  Atrial septum is intact without a PFO.  Aorta is normal.  No pericardial effusion is seen.    Impression  No evidence for intracardiac thrombus is present.  Left atrial enlargement.  Left atrial appendage enlargement.  Normal left ventricular size and contractility with ejection fraction of 60%.  No evidence for PFO is present.      Labs Pending at Discharge:  Pending Labs     Order Current Status    Potassium Collected (04/17/22 1501)    Tissue Pathology Exam In process          Procedures Performed  Procedure(s):  ESOPHAGOGASTRODUODENOSCOPY WITH PERCUTANEOUS ENDOSCOPIC GASTROSTOMY TUBE INSERTION AND BIOPSY  AREA  04/15 1431 UPPER GI ENDOSCOPY      Consults:   Consults     Date and Time Order Name Status Description    4/15/2022  7:46 AM Inpatient Gastroenterology Consult Completed     4/13/2022  2:34 PM Inpatient Cardiology Consult Completed     4/12/2022  7:30 PM Inpatient Neurology Consult Stroke Completed     4/12/2022  7:01 PM Hospitalist (on-call MD unless specified)            Assessment/Plan   Principal Problem:    Suspected cerebrovascular accident (CVA)  Active Problems:    Aphasia    Essential hypertension    Dementia with behavioral disturbance (HCC)    Facial droop    Feeding difficulty     ]]]]]]]]]]]]]]]]]]]]  Impression  ==========  -Recent speech difficulty and left facial droop.     Patient has multiple acute to subacute tiny strokes within left temporal lobe, left basal ganglia, right frontal lobe. Strokes are embolic within both the right and left MCA territory.   - CT head- no acute  findings   - MRI brain-  Incomplete examination as described above. Several tiny subacute infarcts within left temporal lobe, left basal ganglia, and right frontal lobe. No large hemorrhagic transformation.  - CTA head and neck- Cerebral vasculature patent without obvious abrupt, acute-appearing large vessel occlusion. No significant ICA stenosis. Focal moderate grade stenosis mid to distal P2 segment of the left PCA.      Echocardiogram 4/14/2022 revealed concentric left ventricle hypertrophy and ejection fraction of 70%.     BRIAN 4/15/2022 revealed  No evidence for intracardiac thrombus is present.  Left atrial enlargement.  Left atrial appendage enlargement.  Normal left ventricular size and contractility with ejection fraction of 60%.  No evidence for PFO is present.     - Right bundle branch block     - History of hypertension.     - History of dementia.     -Denies having any surgical problems in the past.     - Non-smoker     - No known allergies  ============  Plan  ===========  Patient appears to have had probably cardioembolic episode.  Patient has history of hypertension  Patient has right bundle branch block.  Echocardiogram (transthoracic) as above.  BRIAN-as above.  Hypokalemia-3.2-new problem.  Supplements.  Patient to have PEG placement  Further plan will depend on patient's progress.  ]]]]]]]]]]]]]]]]]]]]]      Aramis Vilchis MD  Discharge Details        Discharge Medications      New Medications      Instructions Start Date   aspirin 81 MG chewable tablet   324 mg, Oral, Daily   Start Date: April 18, 2022     atorvastatin 80 MG tablet  Commonly known as: LIPITOR   80 mg, Oral, Nightly      cyanocobalamin 1000 MCG tablet  Commonly known as: [employer] Vitamin B-12   1,000 mcg, Oral, Daily      folic acid 1 MG tablet  Commonly known as: FOLVITE   1 mg, Oral, Daily   Start Date: April 18, 2022     polyethylene glycol 17 g packet  Commonly known as: MIRALAX   17 g, Oral, Daily   Start Date: April 18, 2022      risperiDONE 0.5 MG tablet  Commonly known as: risperDAL   0.5 mg, Oral, Every 12 Hours PRN      sennosides-docusate 8.6-50 MG per tablet  Commonly known as: PERICOLACE   2 tablets, Oral, 2 Times Daily         Changes to Medications      Instructions Start Date   potassium chloride 20 MEQ CR tablet  Commonly known as: K-DUR,KLOR-CON  What changed:   · when to take this  · additional instructions   20 mEq, Oral, 2 Times Daily, Check bmp in one week         Continue These Medications      Instructions Start Date   memantine 5 MG tablet  Commonly known as: NAMENDA   5 mg, Oral, Daily         Stop These Medications    amLODIPine 10 MG tablet  Commonly known as: NORVASC     metoprolol succinate XL 50 MG 24 hr tablet  Commonly known as: TOPROL-XL            No Known Allergies      Discharge Disposition:   Skilled Nursing Facility (DC - External)    Diet:  Hospital:  Diet Order   Procedures   • NPO Diet NPO Type: Strict NPO         Discharge Activity:   Activity Instructions     Gradually Increase Activity Until at Pre-Hospitalization Level          CODE STATUS:  Code Status and Medical Interventions:   Ordered at: 04/12/22 1930     Code Status (Patient has no pulse and is not breathing):    CPR (Attempt to Resuscitate)     Medical Interventions (Patient has pulse or is breathing):    Full Support     No future appointments.    Additional Instructions for the Follow-ups that You Need to Schedule     Discharge Follow-up with PCP   As directed       Currently Documented PCP:    Suzanna Posada PA    PCP Phone Number:    [phone]     Follow Up Details: 1 week             Time spent on Discharge including face to face service:  34 minutes    This patient has been examined wearing appropriate Personal Protective Equipment and discussed with rn. 04/17/22      Signature: Electronically signed by Juan Santoyo MD, 04/17/22, 3:11 PM EDT.

## 2022-04-17 NOTE — PROGRESS NOTES
Nutrition Services    Patient Name: Goran Dunbar  YOB: 1950  MRN: [id number]  Admission date: 4/12/2022    PPE Documentation        PPE Worn By Provider Did not enter room for this encounter   PPE Worn By Patient  N/A     PROGRESS NOTE      Encounter Information: 4/17: Progress note to monitor tube feeding. Infusing at initial goal of 30mL/hour without issues. D/C orders are noted.       PO Diet: NPO Diet NPO Type: Strict NPO   PO Supplements:    PO Intake:  NPO       Nutrition support orders: Isosource 1.5 at 30mL/hr + 10mL/hr water flush    Nutrition support review: Infusing as above per nursing notes       Labs (reviewed below): Hypokalemia, stable- replacement documented   Creat low        GI Function:  No BM since admit, on bowel regimen       Nutrition Intervention: Increase TF to goal of Isosource 1.5 @60mL/hour with 30mL/hour free water flush       Results from last 7 days   Lab Units 04/17/22  0728 04/16/22  0041 04/15/22  0310 04/13/22  0048 04/12/22  1745   SODIUM mmol/L 138 140 139 141 139   POTASSIUM mmol/L 3.2* 3.2* 3.7 3.6 3.9   CHLORIDE mmol/L 106 102 103 104 102   CO2 mmol/L 23.0 22.0 23.0 25.0 25.0   BUN mg/dL 20 13 17 11 12   CREATININE mg/dL 0.85 0.74* 0.80 0.86 0.96   CALCIUM mg/dL 8.9 9.0 9.5 9.0 9.2   BILIRUBIN mg/dL  --  1.7*  --  0.9 1.0   ALK PHOS U/L  --  107  --  104 116   ALT (SGPT) U/L  --  10  --  14 11   AST (SGOT) U/L  --  23  --  21 20   GLUCOSE mg/dL 112* 66 79 93 112*     Results from last 7 days   Lab Units 04/17/22  0055 04/16/22  0041 04/15/22  0310 04/13/22  0048 04/12/22  1745   MAGNESIUM mg/dL 1.9 1.8 1.8  --   --    PHOSPHORUS mg/dL 2.9  --   --   --   --    HEMOGLOBIN g/dL 12.5* 12.2* 13.3   < > 13.7   HEMATOCRIT % 35.7* 34.2* 37.5   < > 38.5   TRIGLYCERIDES mg/dL  --   --   --   --  99    < > = values in this interval not displayed.     COVID19   Date Value Ref Range Status   04/12/2022 Not Detected Not Detected - Ref. Range Final     Lab Results    Component Value Date    HGBA1C 5.6 04/12/2022       RD to follow up per protocol.    Electronically signed by:  Tiffany Bautista RD  04/17/22 15:12 EDT

## 2022-04-17 NOTE — PLAN OF CARE
Patient to discharge to [facility] with family transporting patient. Patient to have event monitor placed at discharge x30 days.      Problem: Fall Injury Risk  Goal: Absence of Fall and Fall-Related Injury  Outcome: Adequate for Care Transition     Problem: Skin Injury Risk Increased  Goal: Skin Health and Integrity  Outcome: Adequate for Care Transition     Problem: Aspiration (Enteral Nutrition)  Goal: Absence of Aspiration Signs and Symptoms  Outcome: Adequate for Care Transition     Problem: Device-Related Complication Risk (Enteral Nutrition)  Goal: Safe, Effective Therapy Delivery  Outcome: Adequate for Care Transition     Problem: Adult Inpatient Plan of Care  Goal: Plan of Care Review  Outcome: Adequate for Care Transition  Goal: Patient-Specific Goal (Individualized)  Outcome: Adequate for Care Transition  Goal: Absence of Hospital-Acquired Illness or Injury  Outcome: Adequate for Care Transition  Goal: Optimal Comfort and Wellbeing  Outcome: Adequate for Care Transition  Goal: Readiness for Transition of Care  Outcome: Adequate for Care Transition   Goal Outcome Evaluation:

## 2022-04-18 NOTE — CASE MANAGEMENT/SOCIAL WORK
Case Management Discharge Note      Final Note: Markus Ayoub    Provided Post Acute Provider List?: Yes  Post Acute Provider List: Inpatient Rehab  Provided Post Acute Provider Quality & Resource List?: Yes  Post Acute Provider Quality and Resource List: Inpatient Rehab  Delivered To: Support Person, Patient  Support Person: Gisele  Method of Delivery: In person    Selected Continued Care - Discharged on 4/17/2022 Admission date: 4/12/2022 - Discharge disposition: Skilled Nursing Facility (DC - External)    Destination Coordination complete.    Service Provider Selected Services Address Phone Fax Patient Preferred    [facility] [address] [place] [phone] --                  Transportation Services  Private: Car    Final Discharge Disposition Code: 03 - skilled nursing facility (SNF)

## 2022-04-19 LAB
LAB AP CASE REPORT: NORMAL
PATH REPORT.FINAL DX SPEC: NORMAL
PATH REPORT.GROSS SPEC: NORMAL

## 2022-05-20 LAB
Lab: 23
TOAL ENROLLMENT DAYS: 30

## 2022-05-20 PROCEDURE — 93272 ECG/REVIEW INTERPRET ONLY: CPT | Performed by: INTERNAL MEDICINE

## (undated) DEVICE — PK ENDO GI 50

## (undated) DEVICE — BITEBLOCK ENDO W/STRAP 60F A/ LF DISP

## (undated) DEVICE — SINGLE-USE BIOPSY FORCEPS: Brand: RADIAL JAW 4

## (undated) DEVICE — KT PEG ENDOVIVE ENFIT SFTY PULL 20F 1P/U